# Patient Record
Sex: FEMALE | Race: WHITE | Employment: FULL TIME | URBAN - METROPOLITAN AREA
[De-identification: names, ages, dates, MRNs, and addresses within clinical notes are randomized per-mention and may not be internally consistent; named-entity substitution may affect disease eponyms.]

---

## 2017-03-07 ENCOUNTER — HOSPITAL ENCOUNTER (EMERGENCY)
Age: 27
Discharge: HOME | End: 2017-03-07
Attending: EMERGENCY MEDICINE
Payer: COMMERCIAL

## 2017-03-07 VITALS
TEMPERATURE: 98.4 F | OXYGEN SATURATION: 99 % | RESPIRATION RATE: 18 BRPM | HEART RATE: 92 BPM | SYSTOLIC BLOOD PRESSURE: 174 MMHG | HEIGHT: 64 IN | BODY MASS INDEX: 45.24 KG/M2 | DIASTOLIC BLOOD PRESSURE: 90 MMHG | WEIGHT: 265 LBS

## 2017-03-07 DIAGNOSIS — J02.0 STREP PHARYNGITIS: Primary | ICD-10-CM

## 2017-03-07 LAB
DIRECT EXAM: ABNORMAL
Lab: ABNORMAL
SPECIMEN DESCRIPTION: ABNORMAL
STATUS: ABNORMAL

## 2017-03-07 PROCEDURE — 87880 STREP A ASSAY W/OPTIC: CPT

## 2017-03-07 PROCEDURE — 6370000000 HC RX 637 (ALT 250 FOR IP): Performed by: EMERGENCY MEDICINE

## 2017-03-07 PROCEDURE — 99283 EMERGENCY DEPT VISIT LOW MDM: CPT

## 2017-03-07 RX ORDER — PENICILLIN V POTASSIUM 500 MG/1
500 TABLET ORAL 4 TIMES DAILY
Qty: 40 TABLET | Refills: 0 | Status: SHIPPED | OUTPATIENT
Start: 2017-03-07 | End: 2017-03-17

## 2017-03-07 RX ORDER — PENICILLIN V POTASSIUM 250 MG/1
500 TABLET ORAL ONCE
Status: COMPLETED | OUTPATIENT
Start: 2017-03-07 | End: 2017-03-07

## 2017-03-07 RX ADMIN — PENICILLIN V POTASIUM 500 MG: 250 TABLET ORAL at 21:20

## 2017-04-14 ENCOUNTER — OFFICE VISIT (OUTPATIENT)
Dept: FAMILY MEDICINE CLINIC | Age: 27
End: 2017-04-14
Payer: COMMERCIAL

## 2017-04-14 VITALS
DIASTOLIC BLOOD PRESSURE: 96 MMHG | HEART RATE: 99 BPM | SYSTOLIC BLOOD PRESSURE: 156 MMHG | TEMPERATURE: 98.9 F | OXYGEN SATURATION: 94 % | HEIGHT: 64 IN | WEIGHT: 264.99 LBS | BODY MASS INDEX: 45.24 KG/M2 | RESPIRATION RATE: 18 BRPM

## 2017-04-14 DIAGNOSIS — J02.9 SORE THROAT: ICD-10-CM

## 2017-04-14 DIAGNOSIS — J02.0 STREP PHARYNGITIS: Primary | ICD-10-CM

## 2017-04-14 DIAGNOSIS — I10 ESSENTIAL HYPERTENSION: ICD-10-CM

## 2017-04-14 LAB — S PYO AG THROAT QL: POSITIVE

## 2017-04-14 PROCEDURE — 87880 STREP A ASSAY W/OPTIC: CPT | Performed by: FAMILY MEDICINE

## 2017-04-14 PROCEDURE — 99213 OFFICE O/P EST LOW 20 MIN: CPT | Performed by: FAMILY MEDICINE

## 2017-04-14 RX ORDER — LISINOPRIL 5 MG/1
5 TABLET ORAL DAILY
Qty: 30 TABLET | Refills: 1 | Status: SHIPPED | OUTPATIENT
Start: 2017-04-14

## 2017-04-14 RX ORDER — AMOXICILLIN 500 MG/1
500 TABLET, FILM COATED ORAL 3 TIMES DAILY
Qty: 30 TABLET | Refills: 0 | Status: SHIPPED | OUTPATIENT
Start: 2017-04-14 | End: 2017-04-24

## 2017-04-14 RX ORDER — PREDNISONE 50 MG/1
50 TABLET ORAL DAILY
Qty: 4 TABLET | Refills: 0 | Status: SHIPPED | OUTPATIENT
Start: 2017-04-14

## 2017-04-14 ASSESSMENT — ENCOUNTER SYMPTOMS
WHEEZING: 0
NAUSEA: 0
VISUAL CHANGE: 0
EYE REDNESS: 0
RHINORRHEA: 0
CHEST TIGHTNESS: 0
COUGH: 0
EYE PAIN: 0
SORE THROAT: 1
VOMITING: 0
SINUS PRESSURE: 1
EYE DISCHARGE: 0
SWOLLEN GLANDS: 1
ABDOMINAL PAIN: 0
CHANGE IN BOWEL HABIT: 0

## 2023-02-16 ENCOUNTER — HOSPITAL ENCOUNTER (EMERGENCY)
Age: 33
Discharge: HOME | End: 2023-02-16
Attending: EMERGENCY MEDICINE
Payer: COMMERCIAL

## 2023-02-16 ENCOUNTER — APPOINTMENT (OUTPATIENT)
Dept: CT IMAGING | Age: 33
End: 2023-02-16
Payer: COMMERCIAL

## 2023-02-16 VITALS
DIASTOLIC BLOOD PRESSURE: 119 MMHG | HEART RATE: 87 BPM | RESPIRATION RATE: 18 BRPM | WEIGHT: 280 LBS | TEMPERATURE: 98.1 F | OXYGEN SATURATION: 97 % | BODY MASS INDEX: 47.8 KG/M2 | SYSTOLIC BLOOD PRESSURE: 185 MMHG | HEIGHT: 64 IN

## 2023-02-16 DIAGNOSIS — R79.89 ELEVATED LFTS: ICD-10-CM

## 2023-02-16 DIAGNOSIS — N39.0 URINARY TRACT INFECTION WITH HEMATURIA, SITE UNSPECIFIED: Primary | ICD-10-CM

## 2023-02-16 DIAGNOSIS — R10.11 ABDOMINAL PAIN, RIGHT UPPER QUADRANT: ICD-10-CM

## 2023-02-16 DIAGNOSIS — N83.202 CYSTS OF BOTH OVARIES: ICD-10-CM

## 2023-02-16 DIAGNOSIS — R31.9 URINARY TRACT INFECTION WITH HEMATURIA, SITE UNSPECIFIED: Primary | ICD-10-CM

## 2023-02-16 DIAGNOSIS — N83.201 CYSTS OF BOTH OVARIES: ICD-10-CM

## 2023-02-16 LAB
ABSOLUTE EOS #: 0.1 K/UL (ref 0–0.4)
ABSOLUTE LYMPH #: 1.9 K/UL (ref 1–4.8)
ABSOLUTE MONO #: 0.7 K/UL (ref 0.1–1.2)
ALBUMIN SERPL-MCNC: 4.5 G/DL (ref 3.5–5.2)
ALBUMIN/GLOBULIN RATIO: 1.4 (ref 1–2.5)
ALP SERPL-CCNC: 145 U/L (ref 35–104)
ALT SERPL-CCNC: 103 U/L (ref 5–33)
ANION GAP SERPL CALCULATED.3IONS-SCNC: 11 MMOL/L (ref 9–17)
AST SERPL-CCNC: 164 U/L
BACTERIA: ABNORMAL
BASOPHILS # BLD: 0 % (ref 0–2)
BASOPHILS ABSOLUTE: 0 K/UL (ref 0–0.2)
BILIRUB DIRECT SERPL-MCNC: 0.2 MG/DL
BILIRUB INDIRECT SERPL-MCNC: 0.3 MG/DL (ref 0–1)
BILIRUB SERPL-MCNC: 0.5 MG/DL (ref 0.3–1.2)
BILIRUBIN URINE: NEGATIVE
BUN SERPL-MCNC: 14 MG/DL (ref 6–20)
CALCIUM SERPL-MCNC: 9 MG/DL (ref 8.6–10.4)
CHLORIDE SERPL-SCNC: 100 MMOL/L (ref 98–107)
CO2 SERPL-SCNC: 26 MMOL/L (ref 20–31)
COLOR: YELLOW
CREAT SERPL-MCNC: 0.63 MG/DL (ref 0.5–0.9)
EOSINOPHILS RELATIVE PERCENT: 1 % (ref 1–4)
EPITHELIAL CELLS UA: ABNORMAL /HPF (ref 0–5)
GFR SERPL CREATININE-BSD FRML MDRD: >60 ML/MIN/1.73M2
GLUCOSE SERPL-MCNC: 121 MG/DL (ref 70–99)
GLUCOSE UR STRIP.AUTO-MCNC: NEGATIVE MG/DL
HCG QUALITATIVE: NEGATIVE
HCT VFR BLD AUTO: 43.2 % (ref 36–46)
HGB BLD-MCNC: 14.4 G/DL (ref 12–16)
KETONES UR STRIP.AUTO-MCNC: NEGATIVE MG/DL
LEUKOCYTE ESTERASE UR QL STRIP.AUTO: ABNORMAL
LIPASE SERPL-CCNC: 38 U/L (ref 13–60)
LYMPHOCYTES # BLD: 16 % (ref 24–44)
MCH RBC QN AUTO: 30.4 PG (ref 26–34)
MCHC RBC AUTO-ENTMCNC: 33.3 G/DL (ref 31–37)
MCV RBC AUTO: 91.3 FL (ref 80–100)
MONOCYTES # BLD: 6 % (ref 2–11)
NITRITE UR QL STRIP.AUTO: NEGATIVE
OTHER OBSERVATIONS UA: ABNORMAL
PDW BLD-RTO: 13 % (ref 12.5–15.4)
PLATELET # BLD AUTO: 325 K/UL (ref 140–450)
PMV BLD AUTO: 7.2 FL (ref 6–12)
POTASSIUM SERPL-SCNC: 4.1 MMOL/L (ref 3.7–5.3)
PROT SERPL-MCNC: 7.8 G/DL (ref 6.4–8.3)
PROT UR STRIP.AUTO-MCNC: 7 MG/DL (ref 5–8)
PROT UR STRIP.AUTO-MCNC: NEGATIVE MG/DL
RBC # BLD: 4.74 M/UL (ref 4–5.2)
RBC CLUMPS #/AREA URNS AUTO: ABNORMAL /HPF (ref 0–2)
SEG NEUTROPHILS: 77 % (ref 36–66)
SEGMENTED NEUTROPHILS ABSOLUTE COUNT: 8.9 K/UL (ref 1.8–7.7)
SODIUM SERPL-SCNC: 137 MMOL/L (ref 135–144)
SPECIFIC GRAVITY UA: 1.01 (ref 1–1.03)
TURBIDITY: CLEAR
URINE HGB: ABNORMAL
UROBILINOGEN, URINE: NORMAL
WBC # BLD AUTO: 11.6 K/UL (ref 3.5–11)
WBC UA: ABNORMAL /HPF (ref 0–5)

## 2023-02-16 PROCEDURE — 6360000002 HC RX W HCPCS: Performed by: EMERGENCY MEDICINE

## 2023-02-16 PROCEDURE — 85025 COMPLETE CBC W/AUTO DIFF WBC: CPT

## 2023-02-16 PROCEDURE — 96375 TX/PRO/DX INJ NEW DRUG ADDON: CPT

## 2023-02-16 PROCEDURE — 84703 CHORIONIC GONADOTROPIN ASSAY: CPT

## 2023-02-16 PROCEDURE — 6370000000 HC RX 637 (ALT 250 FOR IP): Performed by: EMERGENCY MEDICINE

## 2023-02-16 PROCEDURE — 36415 COLL VENOUS BLD VENIPUNCTURE: CPT

## 2023-02-16 PROCEDURE — 87086 URINE CULTURE/COLONY COUNT: CPT

## 2023-02-16 PROCEDURE — 74176 CT ABD & PELVIS W/O CONTRAST: CPT

## 2023-02-16 PROCEDURE — 80076 HEPATIC FUNCTION PANEL: CPT

## 2023-02-16 PROCEDURE — 2580000003 HC RX 258: Performed by: EMERGENCY MEDICINE

## 2023-02-16 PROCEDURE — 96374 THER/PROPH/DIAG INJ IV PUSH: CPT

## 2023-02-16 PROCEDURE — 80048 BASIC METABOLIC PNL TOTAL CA: CPT

## 2023-02-16 PROCEDURE — 81001 URINALYSIS AUTO W/SCOPE: CPT

## 2023-02-16 PROCEDURE — 83690 ASSAY OF LIPASE: CPT

## 2023-02-16 PROCEDURE — 99284 EMERGENCY DEPT VISIT MOD MDM: CPT

## 2023-02-16 RX ORDER — ONDANSETRON 2 MG/ML
4 INJECTION INTRAMUSCULAR; INTRAVENOUS ONCE
Status: COMPLETED | OUTPATIENT
Start: 2023-02-16 | End: 2023-02-16

## 2023-02-16 RX ORDER — KETOROLAC TROMETHAMINE 30 MG/ML
30 INJECTION, SOLUTION INTRAMUSCULAR; INTRAVENOUS ONCE
Status: COMPLETED | OUTPATIENT
Start: 2023-02-16 | End: 2023-02-16

## 2023-02-16 RX ORDER — ONDANSETRON 4 MG/1
4 TABLET, ORALLY DISINTEGRATING ORAL EVERY 8 HOURS PRN
Qty: 10 TABLET | Refills: 0 | Status: SHIPPED | OUTPATIENT
Start: 2023-02-16

## 2023-02-16 RX ORDER — CEPHALEXIN 500 MG/1
500 CAPSULE ORAL 2 TIMES DAILY
Qty: 14 CAPSULE | Refills: 0 | Status: SHIPPED | OUTPATIENT
Start: 2023-02-16 | End: 2023-02-23

## 2023-02-16 RX ORDER — CEPHALEXIN 250 MG/1
500 CAPSULE ORAL ONCE
Status: COMPLETED | OUTPATIENT
Start: 2023-02-16 | End: 2023-02-16

## 2023-02-16 RX ORDER — 0.9 % SODIUM CHLORIDE 0.9 %
1000 INTRAVENOUS SOLUTION INTRAVENOUS ONCE
Status: COMPLETED | OUTPATIENT
Start: 2023-02-16 | End: 2023-02-16

## 2023-02-16 RX ADMIN — SODIUM CHLORIDE 1000 ML: 9 INJECTION, SOLUTION INTRAVENOUS at 21:00

## 2023-02-16 RX ADMIN — CEPHALEXIN 500 MG: 250 CAPSULE ORAL at 22:28

## 2023-02-16 RX ADMIN — KETOROLAC TROMETHAMINE 30 MG: 30 INJECTION, SOLUTION INTRAMUSCULAR; INTRAVENOUS at 21:01

## 2023-02-16 RX ADMIN — ONDANSETRON 4 MG: 2 INJECTION INTRAMUSCULAR; INTRAVENOUS at 21:00

## 2023-02-16 ASSESSMENT — PAIN - FUNCTIONAL ASSESSMENT: PAIN_FUNCTIONAL_ASSESSMENT: 0-10

## 2023-02-16 ASSESSMENT — PAIN SCALES - GENERAL
PAINLEVEL_OUTOF10: 6
PAINLEVEL_OUTOF10: 2

## 2023-02-16 ASSESSMENT — PAIN DESCRIPTION - ORIENTATION: ORIENTATION: MID;UPPER

## 2023-02-16 ASSESSMENT — PAIN DESCRIPTION - LOCATION: LOCATION: ABDOMEN

## 2023-02-16 ASSESSMENT — PAIN DESCRIPTION - PAIN TYPE: TYPE: ACUTE PAIN

## 2023-02-17 NOTE — ED PROVIDER NOTES
[facility] Emergency Department  [id number] [address] [facility] [address] [facility] [address]  Phone: [phone]  Fax: [phone]      Pt Name: Etelvina Rodríguez  EIO:[id number]  [place] 1990  Date of evaluation: 2/16/2023      CHIEF COMPLAINT       Chief Complaint   Patient presents with    Abdominal Pain     Mid upper abd  Sudden around 7pm  Vomiting one episode  Diarrheax1     Nausea     Currently nauseous       HISTORY OF PRESENT ILLNESS   Etelvina Rodríguez is a 34 y.o. female who presents for evaluation of abdominal pain. The patient reports that she ate baked chicken and vegetables around 5:30 PM.  Starting around 7 PM she developed gradual onset, constant, progressive, sharp, stabbing, cramping, nonradiating, upper abdominal pain with associated nausea, 1 episode of nonbilious nonbloody emesis, and 1 episode of nonbloody diarrhea. She has not taken any medications for symptoms and does not list any provoking or palliating factors. She denies any history of abdominal surgeries or any abdominal injury. 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 She denies any chronic medical problems and does not take any medications on a regular basis. Her last menstrual period was on 1/18/2023 and was normal for her. She denies any recent alcohol use. The patient has had 40 pounds of intentional weight loss since August 2022. She denies any history of gallstones or gallbladder disease. The patient denies fever, chills, headache, vision changes, neck pain, back pain, chest pain, shortness of breath, urinary symptoms, vaginal bleeding, vaginal discharge, hematuria, hemoptysis, hematochezia, melena, focal weakness, numbness, tingling, dizziness, lightheadedness, syncope, recent injury or illness.     REVIEW OF SYSTEMS     Positive: Abdominal pain, nausea, vomiting, diarrhea  Ten point review of systems was reviewed and is negative unless otherwise noted in the HPI    PAST MEDICAL HISTORY    has a past medical history of Axillary pain, Hypertension, Iron deficiency anemia, and Left wrist fracture. SURGICAL HISTORY      has a past surgical history that includes Knee arthroscopy (Left, 2001/2002/2004/2006); lipoma resection (Right, 12/16/13); and Breast surgery (Right, 04/02/14). CURRENT MEDICATIONS       Discharge Medication List as of 2/16/2023 10:45 PM          ALLERGIES     has No Known Allergies. FAMILY HISTORY     She indicated that her mother is alive. She indicated that her father is alive. She indicated that her sister is alive. She indicated that her brother is alive. She indicated that the status of her maternal grandfather is unknown.     family history includes Diabetes in her maternal grandfather; Hypertension in her maternal grandfather; No Known Problems in her brother, father, mother, and sister. SOCIAL HISTORY      reports that she has never smoked. She has never used smokeless tobacco. She reports that she does not currently use alcohol. She reports that she does not use drugs. PHYSICAL EXAM     INITIAL VITALS:  height is 5' 4\" (1.626 m) and weight is 127 kg (280 lb). Her temperature is 98.1 °F (36.7 °C). Her blood pressure is 185/119 (abnormal) and her pulse is 87. Her respiration is 18 and oxygen saturation is 97%. CONSTITUTIONAL: no apparent distress, well appearing  SKIN: warm, dry, no jaundice, hives or petechiae  EYES: clear conjunctiva, non-icteric sclera  HENT: normocephalic, atraumatic, dry mucus membranes  NECK: Nontender and supple with no nuchal rigidity, full range of motion  PULMONARY: clear to auscultation without wheezes, rhonchi, or rales, normal excursion, no accessory muscle use and no stridor  CARDIOVASCULAR: regular rate, rhythm. Strong radial pulses with intact distal perfusion. Capillary refill <2 seconds.   GASTROINTESTINAL: soft, epigastric and right upper quadrant tenderness to palpation, positive Wood sign, no pain or McBurney's point, no pulsatile mass, non-distended, no palpable masses, no rebound or guarding   GENITOURINARY: No costovertebral angle tenderness to palpation  MUSCULOSKELETAL: No midline spinal tenderness, step off or deformity. Extremities are otherwise nontender to palpation and nonerythematous. Compartments soft. No peripheral edema. NEUROLOGIC: alert and oriented x 3, GCS 15, normal mentation and speech. Moves all extremities x 4 without motor or sensory deficit, gait is stable without ataxia  PSYCHIATRIC: normal mood and affect, thought process is clear and linear    DIAGNOSTIC RESULTS     EKG:  None    RADIOLOGY:   CT ABDOMEN PELVIS WO CONTRAST Additional Contrast? None    Result Date: 2/16/2023  EXAMINATION: CT OF THE ABDOMEN AND PELVIS WITHOUT CONTRAST 2/16/2023 9:32 pm TECHNIQUE: CT of the abdomen and pelvis was performed without the administration of intravenous contrast. Multiplanar reformatted images are provided for review. Automated exposure control, iterative reconstruction, and/or weight based adjustment of the mA/kV was utilized to reduce the radiation dose to as low as reasonably achievable. COMPARISON: None. HISTORY: ORDERING SYSTEM PROVIDED HISTORY: RUQ and epigastric abdominal pain, N/V/D TECHNOLOGIST PROVIDED HISTORY: RUQ and epigastric abdominal pain, N/V/D Decision Support Exception - unselect if not a suspected or confirmed emergency medical condition->Emergency Medical Condition (MA) Is the patient pregnant?->No Reason for Exam: RUQ and epigastric abdominal pain, N/V/D FINDINGS: Lower Chest: The lung bases are clear. Organs: The lack of IV contrast does reduce evaluation of the organs and vasculature. No obvious mass or nodularity along the liver margin. Biliary system is not dilated and no mineralized stone in the gallbladder. The spleen is not enlarged. There is no pancreatic calcification, ductal dilatation, or surrounding fluid collection. The adrenal glands are unremarkable.  No renal calculi, hydronephrosis, or hydroureter on either side. No perinephric stranding. GI/Bowel: The stomach, small bowel, and colon are not dilated. Moderate amount of food material is present in the stomach. The appendix is identified and no appendicitis. Mild fecal load in the ascending colon up through the hepatic flexure. The distal colon is mostly collapsed without a large rectal fecal bolus. There is no sign of diverticulitis. There is no ascites or pneumoperitoneum. Pelvis: The urinary bladder is collapsed limiting evaluation. The uterus is not enlarged. No free fluid in the pelvis. A 2 cm right ovarian cyst and 2.5 cm left ovarian cyst are likely functional with the young age. Peritoneum/Retroperitoneum: There is no abdominal aortic aneurysm or retroperitoneal hematoma. No bulky lymphadenopathy. Bones/Soft Tissues: No acute fracture or destruction of the bones. 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 Mild facet arthropathy is developing in the lumbar spine. 1.  No mineralized stone in the gallbladder or fat stranding around the gallbladder fossa. The biliary system is not dilated. 2.  No renal calculi, hydronephrosis, hydroureter, or perinephric stranding. 3.  Small cyst in the right ovary and left ovary are likely functional with the young age. No free fluid in the pelvis. No further workup is indicated. Managing Incidental Adnexal Cystic Mass by CT or MR Benign cyst: Premenopausal (< or equal to 50 years if LMP unknown) < or equal to 5 cm: no follow up >5 cm: US in 6-12 weeks > or equal to 10 cm: US promptly Reference: Iván Swain et al. Managing Incidental Findings on Abdominal CT: White Paper of the ACR Incidental Findings Committee.  J Am Bernadette Radiol [id number]        LABS:  Results for orders placed or performed during the hospital encounter of 02/16/23   CBC with Auto Differential   Result Value Ref Range    WBC 11.6 (H) 3.5 - 11.0 k/uL    RBC 4.74 4.0 - 5.2 m/uL    Hemoglobin 14.4 12.0 - 16.0 g/dL    Hematocrit 43.2 36 - 46 %    MCV 91.3 80 - 100 fL    MCH 30.4 26 - 34 pg    MCHC 33.3 31 - 37 g/dL    RDW 13.0 12.5 - 15.4 %    Platelets 768 992 - 034 k/uL    MPV 7.2 6.0 - 12.0 fL    Seg Neutrophils 77 (H) 36 - 66 %    Lymphocytes 16 (L) 24 - 44 %    Monocytes 6 2 - 11 %    Eosinophils % 1 1 - 4 %    Basophils 0 0 - 2 %    Segs Absolute 8.90 (H) 1.8 - 7.7 k/uL    Absolute Lymph # 1.90 1.0 - 4.8 k/uL    Absolute Mono # 0.70 0.1 - 1.2 k/uL    Absolute Eos # 0.10 0.0 - 0.4 k/uL    Basophils Absolute 0.00 0.0 - 0.2 k/uL   BMP   Result Value Ref Range    Glucose 121 (H) 70 - 99 mg/dL    BUN 14 6 - 20 mg/dL    Creatinine 0.63 0.50 - 0.90 mg/dL    Est, Glom Filt Rate >60 >60 mL/min/1.73m2    Calcium 9.0 8.6 - 10.4 mg/dL    Sodium 137 135 - 144 mmol/L    Potassium 4.1 3.7 - 5.3 mmol/L    Chloride 100 98 - 107 mmol/L    CO2 26 20 - 31 mmol/L    Anion Gap 11 9 - 17 mmol/L   Lipase   Result Value Ref Range    Lipase 38 13 - 60 U/L   HCG Qualitative, Serum   Result Value Ref Range    hCG Qual NEGATIVE NEGATIVE   Hepatic Function Panel   Result Value Ref Range    Albumin 4.5 3.5 - 5.2 g/dL    Alkaline Phosphatase 145 (H) 35 - 104 U/L     (H) 5 - 33 U/L     (H) <32 U/L    Total Bilirubin 0.5 0.3 - 1.2 mg/dL    Bilirubin, Direct 0.2 <0.3 mg/dL    Bilirubin, Indirect 0.3 0.0 - 1.0 mg/dL    Total Protein 7.8 6.4 - 8.3 g/dL    Albumin/Globulin Ratio 1.4 1.0 - 2.5   Urinalysis with Reflex to Culture    Specimen: Urine, clean catch   Result Value Ref Range    Color, UA Yellow Yellow    Turbidity UA Clear Clear    Glucose, Ur NEGATIVE NEGATIVE    Bilirubin Urine NEGATIVE NEGATIVE    Ketones, Urine NEGATIVE NEGATIVE    Specific Gravity, UA 1.010 1.005 - 1.030    Urine Hgb MODERATE (A) NEGATIVE    pH, UA 7.0 5.0 - 8.0    Protein, UA NEGATIVE NEGATIVE    Urobilinogen, Urine Normal Normal    Nitrite, Urine NEGATIVE NEGATIVE    Leukocyte Esterase, Urine SMALL (A) NEGATIVE   Microscopic Urinalysis   Result Value Ref Range    WBC, UA 10 TO 20 0 - 5 /HPF    RBC, UA 5 TO 10 0 - 2 /HPF    Epithelial Cells UA 2 TO 5 0 - 5 /HPF    Bacteria, UA MODERATE (A) None    Other Observations UA Culture ordered based on defined criteria. (A) NOT REQ. EMERGENCY DEPARTMENT COURSE:        The patient was given the following medications:  Orders Placed This Encounter   Medications    0.9 % sodium chloride bolus    ondansetron (ZOFRAN) injection 4 mg    ketorolac (TORADOL) injection 30 mg    cephALEXin (KEFLEX) capsule 500 mg     Order Specific Question:   Antimicrobial Indications     Answer:   Urinary Tract Infection    cephALEXin (KEFLEX) 500 MG capsule     Sig: Take 1 capsule by mouth 2 times daily for 7 days     Dispense:  14 capsule     Refill:  0    ondansetron (ZOFRAN-ODT) 4 MG disintegrating tablet     Sig: Take 1 tablet by mouth every 8 hours as needed for Nausea or Vomiting     Dispense:  10 tablet     Refill:  0        Vitals:    Vitals:    02/16/23 2013 02/16/23 2152 02/16/23 2221 02/16/23 2255   BP: (!) 178/120 (!) 191/116  (!) 185/119   Pulse: 100 (!) 102 90 87   Resp: 18 18  18   Temp: 98.1 °F (36.7 °C)      SpO2: 99% 99%  97%   Weight: 127 kg (280 lb)      Height: 5' 4\" (1.626 m)        -------------------------  BP: (!) 185/119, Temp: 98.1 °F (36.7 °C), Heart Rate: 87, Resp: 18    CONSULTS:  None    CRITICAL CARE:   None    PROCEDURES:  None    DIAGNOSIS/ MDM:   Camilla Mcneal is a 34 y.o. female who presents with nausea, vomiting, diarrhea and abdominal pain. Vital signs are stable except for elevated blood pressure. She states that this has been a chronic issue. She denies any associated headache, lightheadedness or dizziness. Abdomen is soft with right upper quadrant and epigastric tenderness to palpation. Positive Wood sign. Urinalysis shows signs of infection and she was started on Keflex. CBC, BMP, lipase and hCG are unremarkable. ALT and AST are elevated at 103 and 164. CT abdomen pelvis shows no mineralized stone in the gallbladder fasting around the gallbladder fossa. The biliary system is not dilated. There is no renal calculi, signs of urinary infection, or perinephric stranding. She does have a small cyst in the right ovary and left ovary that are likely functional ovarian cyst.  The patient was updated on all findings. Her pain was controlled with Toradol. Her nausea improved with Zofran and IV fluids. I suspect the patient's symptoms are secondary to gallbladder colic. She has lost 40 pounds over the past 6 months and this may have been a trigger. I explained that she will need further evaluation outpatient with a gallbladder ultrasound and possible HIDA scan. The patient was instructed to take ibuprofen or Tylenol as needed for pain and to take Zofran as needed for nausea. She was instructed to take her antibiotic as prescribed and to return to the ER for worsening symptoms or any other concern. The patient understands that at this time there is no evidence for a more malignant underlying process, but also understands that early in the process of an illness or injury, an emergency department work-up can be falsely reassuring. Routine discharge counseling was given, and the patient understands that worsening, changing or persistent symptoms should prompt a immediate call or follow-up with their primary care physician or return to the emergency department. The importance of appropriate follow-up was also discussed. I have reviewed the disposition diagnosis with the patient. I have answered their questions and given discharge instructions. They voiced understanding of these instructions and did not have any further questions or complaints. FINAL IMPRESSION      1. Urinary tract infection with hematuria, site unspecified    2. Elevated LFTs    3. Cysts of both ovaries    4.  Abdominal pain, right upper quadrant DISPOSITION/PLAN   DISPOSITION Decision To Discharge 02/16/2023 10:44:01 PM        PATIENT REFERRED TO:  Elba Perez, [address] [facility] Jae Jewell [phone]    Schedule an appointment as soon as possible for a visit in 2 days      [facility] Emergency Department  [address] [place] [address]  [phone]  Go to   If symptoms worsen    DISCHARGE MEDICATIONS:  Discharge Medication List as of 2/16/2023 10:45 PM        START taking these medications    Details   cephALEXin (KEFLEX) 500 MG capsule Take 1 capsule by mouth 2 times daily for 7 days, Disp-14 capsule, R-0Normal      ondansetron (ZOFRAN-ODT) 4 MG disintegrating tablet Take 1 tablet by mouth every 8 hours as needed for Nausea or Vomiting, Disp-10 tablet, R-0Normal             (Please note that portions of this note were completed with a voice recognitionprogram.  Efforts were made to edit the dictations but occasionally words are mis-transcribed.)    Tylor Jaquez DO, [facility]  Emergency Physician Attending          Tylor Jaquez DO  02/17/23 0971

## 2023-02-17 NOTE — DISCHARGE INSTRUCTIONS
If given narcotics (opiates) during this Emergency Department visit, please do not drink, drive or operate any machinery for at least 4 - 6 hours. Talk to your doctor about ordering a ultrasound of your gall bladder and repeating your liver function tests. Avoid eating any spicy food, milk type products or drinks that have caffeine in it. Take all medications as prescribed. For pain use ibuprofen (Motrin) or acetaminophen (Tylenol), unless prescribed medications that have acetaminophen in it. You can take over the counter acetaminophen tablets (1 - 2 tablets of the 500-mg strength every 6 hours) or ibuprofen tablets (2 tablets every 4 hours). PLEASE RETURN TO THE EMERGENCY DEPARTMENT IMMEDIATELY for worsening symptoms, or if you develop any concerning symptoms such as: high fever not relieved by acetaminophen (Tylenol) and/or ibuprofen (Motrin), chills, shortness of breath, chest pain, persistent nausea and/or vomiting, numbness, weakness or tingling in the arms or legs or change in color of the extremities, changes in mental status, persistent headache, blurry vision. Return within 8 - 12 hours if you have any of the following: worsening of pain in your abdomen, no food sounds good to you, you continue to vomit, pain goes to your back, have pain in the abdomen when going over a bump in the car or when you jump up and down, develop vaginal bleeding or discharge, inability to urinate, unable to follow up with your physician, or other any other care or concern.

## 2023-02-18 LAB
MICROORGANISM SPEC CULT: NORMAL
SPECIMEN DESCRIPTION: NORMAL

## 2023-03-01 ENCOUNTER — OFFICE VISIT (OUTPATIENT)
Dept: SURGERY | Age: 33
End: 2023-03-01

## 2023-03-01 VITALS
SYSTOLIC BLOOD PRESSURE: 162 MMHG | BODY MASS INDEX: 48.04 KG/M2 | TEMPERATURE: 97.8 F | HEART RATE: 84 BPM | DIASTOLIC BLOOD PRESSURE: 95 MMHG | WEIGHT: 279.9 LBS

## 2023-03-01 DIAGNOSIS — K80.20 SYMPTOMATIC CHOLELITHIASIS: Primary | ICD-10-CM

## 2023-03-01 RX ORDER — LISINOPRIL 10 MG/1
20 TABLET ORAL DAILY
COMMUNITY

## 2023-03-01 NOTE — PROGRESS NOTES
History and Physical - Surgery Clinic    Patient's Name: Rama Dennis  MRN: [id number]  YOB: 1990 (34 y.o.)    Date of Visit: March 1, 2023     CC: symptomatic cholelithiasis    HPI: Rama Dennis is a/an 34 y.o. female who presents to [address] [place] [address] for evaluation of midepigastric and right upper quadrant pain for the last 2 weeks. She initially started having abdominal pain on 2/16/2023 where she went to the emergency room for evaluation. Her lab work was within normal limits with a mildly elevated LFTs. Her CT scan was performed and did not show obvious gallstones or thickening around the gallbladder. She was sent home, but has had continued epigastric and right upper quadrant pain causing her to eat very bland food. She has lost 50 pounds over the last 6 months. She denies abdominal surgeries. She had an ultrasound by her PCP yesterday which showed a gallstone in the neck of the gallbladder [facility]. On evaluation today, the patient has tenderness to palpation in the right upper abdomen. She has had continued nausea and vomiting over the last 2 weeks. Past Medical History:   Diagnosis Date    Axillary pain     Bilat    Hypertension     Iron deficiency anemia     Left wrist fracture 2002       Past Surgical History:   Procedure Laterality Date    BREAST SURGERY Right 04/02/14    Needle Aspiration    KNEE ARTHROSCOPY Left 2001/2002/2004/2006    No meniscus left    LIPOMA RESECTION Right 12/16/13    Axillary       Current Outpatient Medications   Medication Sig Dispense Refill    lisinopril (PRINIVIL;ZESTRIL) 10 MG tablet Take 10 mg by mouth daily      ondansetron (ZOFRAN-ODT) 4 MG disintegrating tablet Take 1 tablet by mouth every 8 hours as needed for Nausea or Vomiting 10 tablet 0     No current facility-administered medications for this visit.        No Known Allergies    Family History   Problem Relation Age of Onset    Diabetes Maternal Grandfather     Hypertension Maternal Grandfather     No Known Problems Mother     No Known Problems Father     No Known Problems Sister     No Known Problems Brother        Social History     Socioeconomic History    Marital status: Single     Spouse name: Not on file    Number of children: Not on file    Years of education: Not on file    Highest education level: Not on file   Occupational History    Not on file   Tobacco Use    Smoking status: Never    Smokeless tobacco: Never   Vaping Use    Vaping Use: Never used   Substance and Sexual Activity    Alcohol use: Not Currently    Drug use: No    Sexual activity: Never   Other Topics Concern    Not on file   Social History Narrative    Not on file     Social Determinants of Health     Financial Resource Strain: Not on file   Food Insecurity: Not on file   Transportation Needs: Not on file   Physical Activity: Not on file   Stress: Not on file   Social Connections: Not on file   Intimate Partner Violence: Not on file   Housing Stability: Not on file       Review of Systems:   GEN: Denies recent weight loss, fatigue, fevers, chills. HEENT: No rhinorrhea, dysphagia, odynphagia. CV: Denies recent chest pain. No history of MI or arrhythmias. RESP: Denies shortness of breath, COPD, asthma. GI: As per HPI  : Denies increased frequency or dysuria. HEM[de-identified] Denies history of anemia or DVTs. ENDO: Denies history of thyroid problems  MSK: Denies joint and back pain. NEURO: Denies history of previous stroke    Physical Exam:    Vitals:    03/01/23 1130   BP: (!) 162/95   Pulse: 84   Temp: 97.8 °F (36.6 °C)       General: Alert and oriented x 3. Non toxic in appearance. No acute distress. Head[de-identified]  Non traumatic  Eyes: pupils reactive,  EOMI bilaterally  Neck: trachea midline. Heart: Regular rate and rhythm. Lungs: No respiratory distress, clear breath sounds bilaterally. Abdomen: Soft, nondistended, tender to palpation in the right upper quadrant without guarding or rebound tenderness.   No peritoneal signs. Extremity: No gross deformity in all four extremities. Skin: No skin lesion, erythema, rash. Neuro: CN II-XII grossly intact. No motor or sensory deficits appreciated. MSK: Strength intact in all four extremities. Labs:    Liver function test showed an alkaline phosphatase on 145, ALT of 103, AST of 164 on 2/16/2023. Bilirubin was within normal limits. Imaging:     Right upper quadrant ultrasound revealed a gallstone in the neck of the gallbladder without thickening or pericholecystic fluid. Assessment  Symptomatic cholelithiasis      Plan:  I recommend proceeding to the operating room for robotic assisted laparoscopic cholecystectomy due to concerns for 2 weeks of symptomatic cholelithiasis. I discussed with her the risks which include but are not limited to infection, bleeding, damage to surrounding structures requiring further surgical procedures, bile leaks, anesthesia risks. I discussed the benefits and alternatives. All questions and concerns were addressed. We will plan to proceed with a robotic assisted laparoscopic cholecystectomy this Friday. We discussed restrictions postoperatively being no lifting, pushing, pulling greater than 10 pounds for 2 weeks. This will likely require to be off work for 2 weeks.       Kermit Christensen DO  3/1/2023

## 2023-03-01 NOTE — H&P (VIEW-ONLY)
HISTORY and [facility]       NAME:  Beka Mcmullen  MRN: [id number]   YOB: 1990   Date: 3/2/2023   Age: 34 y.o. Gender: female       COMPLAINT AND PRESENT HISTORY:     Beka Mcmullen is 34 y.o. female, undergoing preadmission testing for symptomatic cholelithiasis with scheduled CHOLECYSTECTOMY LAPAROSCOPIC ROBOTIC XI per Dr. Gilford Pies. Below italics a portion of surgery office visit note by Dr. Gilford Pies dated 03/01/2023: Reviewed  CC: symptomatic cholelithiasis     HPI: Beka Mcmullen is a/an 34 y.o. female who presents to [address] [place] [address] for evaluation of midepigastric and right upper quadrant pain for the last 2 weeks. She initially started having abdominal pain on 2/16/2023 where she went to the emergency room for evaluation. Her lab work was within normal limits with a mildly elevated LFTs. Her CT scan was performed and did not show obvious gallstones or thickening around the gallbladder. She was sent home, but has had continued epigastric and right upper quadrant pain causing her to eat very bland food. She has lost 50 pounds over the last 6 months. She denies abdominal surgeries. She had an ultrasound by her PCP yesterday which showed a gallstone in the neck of the gallbladder [facility]. On evaluation today, the patient has tenderness to palpation in the right upper abdomen. She has had continued nausea and vomiting over the last 2 weeks. UPDATE: Pt treated with Keflex for UTI found on UA at ED visit. Denies current urinary symptoms including dysuria, hematuria, frequency and urgency. Completed course of antibiotics as prescribed. Pt continues to have RUQ abdominal and epigastric pain, nausea without associated vomiting. Takes zofran with good relief. No diarrhea or constipation. No change in the color of the stools. No current fever or chills. Denies Hx of MRSA infection. PMHx includes:    HTN: Associated medications include: Lisinopril.  BP elevated today. Reports her BP is always elevated in a healthcare setting. She relays that her lisinopril dose was increased to 20 mg daily with first dose being today. Pt was seen by PCP on 02/20/2023. Denies recent or current chest pain/pressure, palpitations, SOB, headaches, dizziness, lower extremity edema. BP Readings from Last 3 Encounters:   03/02/23 (!) 146/102   03/01/23 (!) 162/95   02/16/23 (!) 185/119       Denies personal and family history of complications with anesthesia. RECENT LABS, IMAGING AND TESTING     EKG done today in PAT with prelim result: Normal sinus rhythm. Lab Results   Component Value Date    WBC 11.6 (H) 02/16/2023    RBC 4.74 02/16/2023    HGB 14.4 02/16/2023    HCT 43.2 02/16/2023    MCV 91.3 02/16/2023    MCH 30.4 02/16/2023    MCHC 33.3 02/16/2023    RDW 13.0 02/16/2023     02/16/2023    MPV 7.2 02/16/2023        Lab Results   Component Value Date     02/16/2023    K 4.1 02/16/2023     02/16/2023    CO2 26 02/16/2023    BUN 14 02/16/2023    CREATININE 0.63 02/16/2023    GLUCOSE 121 (H) 02/16/2023    CALCIUM 9.0 02/16/2023    PROT 7.8 02/16/2023    LABALBU 4.5 02/16/2023    BILITOT 0.5 02/16/2023    ALKPHOS 145 (H) 02/16/2023     (H) 02/16/2023     (H) 02/16/2023     Narrative   EXAMINATION:   CT OF THE ABDOMEN AND PELVIS WITHOUT CONTRAST 2/16/2023 9:32 pm       TECHNIQUE:   CT of the abdomen and pelvis was performed without the administration of   intravenous contrast. Multiplanar reformatted images are provided for review. Automated exposure control, iterative reconstruction, and/or weight based   adjustment of the mA/kV was utilized to reduce the radiation dose to as low   as reasonably achievable. COMPARISON:   None.        HISTORY:   ORDERING SYSTEM PROVIDED HISTORY: RUQ and epigastric abdominal pain, N/V/D   TECHNOLOGIST PROVIDED HISTORY:   RUQ and epigastric abdominal pain, N/V/D       Decision Support Exception - unselect if not a suspected or confirmed   emergency medical condition->Emergency Medical Condition (MA)   Is the patient pregnant?->No   Reason for Exam: RUQ and epigastric abdominal pain, N/V/D       FINDINGS:   Lower Chest: The lung bases are clear. Organs: The lack of IV contrast does reduce evaluation of the organs and   vasculature. No obvious mass or nodularity along the liver margin. Biliary   system is not dilated and no mineralized stone in the gallbladder. The   spleen is not enlarged. There is no pancreatic calcification, ductal   dilatation, or surrounding fluid collection. The adrenal glands are   unremarkable. No renal calculi, hydronephrosis, or hydroureter on either side. No   perinephric stranding. GI/Bowel: The stomach, small bowel, and colon are not dilated. Moderate   amount of food material is present in the stomach. The appendix is   identified and no appendicitis. Mild fecal load in the ascending colon up   through the hepatic flexure. The distal colon is mostly collapsed without a   large rectal fecal bolus. There is no sign of diverticulitis. There is no   ascites or pneumoperitoneum. Pelvis: The urinary bladder is collapsed limiting evaluation. The uterus is   not enlarged. No free fluid in the pelvis. A 2 cm right ovarian cyst and   2.5 cm left ovarian cyst are likely functional with the young age. Peritoneum/Retroperitoneum: There is no abdominal aortic aneurysm or   retroperitoneal hematoma. No bulky lymphadenopathy. Bones/Soft Tissues: No acute fracture or destruction of the bones. There are   degenerative changes along the sacroiliac joints and may have osteitis   condensans ilii. Mild facet arthropathy is developing in the lumbar spine. Impression   1. No mineralized stone in the gallbladder or fat stranding around the   gallbladder fossa. The biliary system is not dilated.        2.  No renal calculi, hydronephrosis, hydroureter, or perinephric stranding. 3.  Small cyst in the right ovary and left ovary are likely functional with   the young age. No free fluid in the pelvis. No further workup is indicated. Managing Incidental Adnexal Cystic Mass by CT or MR       Benign cyst:       Premenopausal (< or equal to 50 years if LMP unknown)       < or equal to 5 cm: no follow up       >5 cm: US in 6-12 weeks       > or equal to 10 cm: US promptly       Reference:       Ernesto Quintero et al. Managing Incidental Findings on Abdominal CT: White Paper of   the ACR Incidental Findings Committee.  J Am Bernadette Radiol 2010;7:754-773       PAST MEDICAL HISTORY     Past Medical History:   Diagnosis Date    Abdominal pain 02/2023    Axillary pain     Bilat    Cholelithiasis     Hypertension     Iron deficiency anemia     Left wrist fracture 01/01/2002    Nausea        SURGICAL HISTORY       Past Surgical History:   Procedure Laterality Date    BREAST SURGERY Right 04/02/2014    needle aspiration    KNEE ARTHROSCOPY Left 2001/2002/2004/2006    No meniscus left    LIPOMA RESECTION Right 12/16/2013    Axillary       FAMILY HISTORY       Family History   Problem Relation Age of Onset    Diabetes Maternal Grandfather     Hypertension Maternal Grandfather     No Known Problems Mother     No Known Problems Father     No Known Problems Sister     No Known Problems Brother        SOCIAL HISTORY       Social History     Socioeconomic History    Marital status: Single     Spouse name: None    Number of children: None    Years of education: None    Highest education level: None   Tobacco Use    Smoking status: Never    Smokeless tobacco: Never   Vaping Use    Vaping Use: Never used   Substance and Sexual Activity    Alcohol use: Yes     Comment: very rare    Drug use: No    Sexual activity: Never        REVIEW OF SYSTEMS      No Known Allergies    Current Outpatient Medications on File Prior to Encounter   Medication Sig Dispense Refill    lisinopril (PRINIVIL;ZESTRIL) 10 MG tablet Take 20 mg by mouth daily      ondansetron (ZOFRAN-ODT) 4 MG disintegrating tablet Take 1 tablet by mouth every 8 hours as needed for Nausea or Vomiting 10 tablet 0     No current facility-administered medications on file prior to encounter. Review of Systems   Constitutional:  Positive for appetite change. Negative for chills, fatigue, fever and unexpected weight change. HENT:  Negative for congestion, dental problem, hearing loss and sore throat. Eyes:  Negative for visual disturbance. Respiratory:  Negative for cough, shortness of breath and wheezing. Cardiovascular:  Negative for chest pain, palpitations and leg swelling. Gastrointestinal:         See HPI   Genitourinary: Negative. Musculoskeletal:  Negative for back pain and neck pain. Skin:  Negative for rash and wound. Neurological:  Negative for dizziness, speech difficulty, light-headedness and numbness. Hematological:  Does not bruise/bleed easily. Psychiatric/Behavioral: Negative. GENERAL PHYSICAL EXAM     Vitals: BP (!) 146/102 Comment: 156/103  Pulse 84   Temp 98 °F (36.7 °C)   Resp 18   Ht 5' 4\" (1.626 m)   Wt 275 lb (124.7 kg)   LMP 02/14/2023 (Approximate)   SpO2 100%   BMI 47.20 kg/m²  Body mass index is 47.2 kg/m². Physical Exam  Constitutional:       General: She is not in acute distress. Appearance: She is well-developed. She is obese. She is not ill-appearing. HENT:      Head: Normocephalic and atraumatic. Nose: Nose normal.      Mouth/Throat:      Mouth: Mucous membranes are moist.      Pharynx: Oropharynx is clear. No oropharyngeal exudate or posterior oropharyngeal erythema. Eyes:      General: No scleral icterus. Right eye: No discharge. Left eye: No discharge. Pupils: Pupils are equal, round, and reactive to light. Neck:      Trachea: No tracheal deviation.    Cardiovascular:      Rate and Rhythm: Normal rate and regular rhythm. Heart sounds: Normal heart sounds. No murmur heard. No friction rub. No gallop. Pulmonary:      Effort: Pulmonary effort is normal. No respiratory distress. Breath sounds: Normal breath sounds. No wheezing, rhonchi or rales. Abdominal:      General: Bowel sounds are normal. There is no distension. Palpations: Abdomen is soft. Tenderness: There is abdominal tenderness. There is no guarding. Musculoskeletal:      Cervical back: Neck supple. Right lower leg: No edema. Left lower leg: No edema. Skin:     General: Skin is warm and dry. Coloration: Skin is not jaundiced. Findings: No bruising, erythema or rash. Neurological:      General: No focal deficit present. Mental Status: She is alert and oriented to person, place, and time. Cranial Nerves: No cranial nerve deficit.       Gait: Gait normal.   Psychiatric:         Mood and Affect: Mood normal.      PROVISIONAL DIAGNOSES / SURGERY:      CHOLECYSTECTOMY LAPAROSCOPIC ROBOTIC XI    Symptomatic cholelithiasis [K80.20]    Patient Active Problem List    Diagnosis Date Noted    Symptomatic cholelithiasis 03/01/2023    Obesity 08/22/2014    Hyperlipemia 10/29/2013    Axillary pain     HTN (hypertension) 09/25/2013    Iron deficiency anemia 09/24/2013    Allergic rhinitis 08/12/2013    Family history of diabetes mellitus 08/12/2013           NICK Mulligan - CNP on 3/2/2023 at 10:38 AM    Total time spent on encounter- PAT provider minutes: 31-40 minutes

## 2023-03-01 NOTE — H&P
HISTORY and PHYSICAL  [facility]       NAME:  Ale Srivastava  MRN: [id number]   YOB: 1990   Date: 3/2/2023   Age: 32 y.o.  Gender: female       COMPLAINT AND PRESENT HISTORY:     Ale Srivastava is 32 y.o. female, undergoing preadmission testing for symptomatic cholelithiasis with scheduled CHOLECYSTECTOMY LAPAROSCOPIC ROBOTIC XI per Dr. Christensen.     Below italics a portion of surgery office visit note by Dr. Christensen dated 03/01/2023: Reviewed  CC: symptomatic cholelithiasis     HPI: Ale Srivastava is a/an 32 y.o. female who presents to [facility] for evaluation of midepigastric and right upper quadrant pain for the last 2 weeks.  She initially started having abdominal pain on 2/16/2023 where she went to the emergency room for evaluation.  Her lab work was within normal limits with a mildly elevated LFTs.  Her CT scan was performed and did not show obvious gallstones or thickening around the gallbladder.  She was sent home, but has had continued epigastric and right upper quadrant pain causing her to eat very bland food.  She has lost 50 pounds over the last 6 months.  She denies abdominal surgeries.  She had an ultrasound by her PCP yesterday which showed a gallstone in the neck of the gallbladder [facility].     On evaluation today, the patient has tenderness to palpation in the right upper abdomen.  She has had continued nausea and vomiting over the last 2 weeks.    UPDATE: Pt treated with Keflex for UTI found on UA at ED visit. Denies current urinary symptoms including dysuria, hematuria, frequency and urgency. Completed course of antibiotics as prescribed. Pt continues to have RUQ abdominal and epigastric pain, nausea without associated vomiting. Takes zofran with good relief. No diarrhea or constipation. No change in the color of the stools. No current fever or chills. Denies Hx of MRSA infection.     PMHx includes:    HTN: Associated medications include: Lisinopril. BP  elevated today. Reports her BP is always elevated in a healthcare setting. She relays that her lisinopril dose was increased to 20 mg daily with first dose being today. Pt was seen by PCP on 02/20/2023. Denies recent or current chest pain/pressure, palpitations, SOB, headaches, dizziness, lower extremity edema. BP Readings from Last 3 Encounters:   03/02/23 (!) 146/102   03/01/23 (!) 162/95   02/16/23 (!) 185/119       Denies personal and family history of complications with anesthesia. RECENT LABS, IMAGING AND TESTING     EKG done today in PAT with prelim result: Normal sinus rhythm. Lab Results   Component Value Date    WBC 11.6 (H) 02/16/2023    RBC 4.74 02/16/2023    HGB 14.4 02/16/2023    HCT 43.2 02/16/2023    MCV 91.3 02/16/2023    MCH 30.4 02/16/2023    MCHC 33.3 02/16/2023    RDW 13.0 02/16/2023     02/16/2023    MPV 7.2 02/16/2023        Lab Results   Component Value Date     02/16/2023    K 4.1 02/16/2023     02/16/2023    CO2 26 02/16/2023    BUN 14 02/16/2023    CREATININE 0.63 02/16/2023    GLUCOSE 121 (H) 02/16/2023    CALCIUM 9.0 02/16/2023    PROT 7.8 02/16/2023    LABALBU 4.5 02/16/2023    BILITOT 0.5 02/16/2023    ALKPHOS 145 (H) 02/16/2023     (H) 02/16/2023     (H) 02/16/2023     Narrative   EXAMINATION:   CT OF THE ABDOMEN AND PELVIS WITHOUT CONTRAST 2/16/2023 9:32 pm       TECHNIQUE:   CT of the abdomen and pelvis was performed without the administration of   intravenous contrast. Multiplanar reformatted images are provided for review. Automated exposure control, iterative reconstruction, and/or weight based   adjustment of the mA/kV was utilized to reduce the radiation dose to as low   as reasonably achievable. COMPARISON:   None.        HISTORY:   ORDERING SYSTEM PROVIDED HISTORY: RUQ and epigastric abdominal pain, N/V/D   TECHNOLOGIST PROVIDED HISTORY:   RUQ and epigastric abdominal pain, N/V/D       Decision Support Exception - unselect if not a suspected or confirmed   emergency medical condition->Emergency Medical Condition (MA)   Is the patient pregnant?->No   Reason for Exam: RUQ and epigastric abdominal pain, N/V/D       FINDINGS:   Lower Chest: The lung bases are clear. Organs: The lack of IV contrast does reduce evaluation of the organs and   vasculature. No obvious mass or nodularity along the liver margin. Biliary   system is not dilated and no mineralized stone in the gallbladder. The   spleen is not enlarged. There is no pancreatic calcification, ductal   dilatation, or surrounding fluid collection. The adrenal glands are   unremarkable. No renal calculi, hydronephrosis, or hydroureter on either side. No   perinephric stranding. GI/Bowel: The stomach, small bowel, and colon are not dilated. Moderate   amount of food material is present in the stomach. The appendix is   identified and no appendicitis. Mild fecal load in the ascending colon up   through the hepatic flexure. The distal colon is mostly collapsed without a   large rectal fecal bolus. There is no sign of diverticulitis. There is no   ascites or pneumoperitoneum. Pelvis: The urinary bladder is collapsed limiting evaluation. The uterus is   not enlarged. No free fluid in the pelvis. A 2 cm right ovarian cyst and   2.5 cm left ovarian cyst are likely functional with the young age. Peritoneum/Retroperitoneum: There is no abdominal aortic aneurysm or   retroperitoneal hematoma. No bulky lymphadenopathy. Bones/Soft Tissues: No acute fracture or destruction of the bones. There are   degenerative changes along the sacroiliac joints and may have osteitis   condensans ilii. Mild facet arthropathy is developing in the lumbar spine. Impression   1. No mineralized stone in the gallbladder or fat stranding around the   gallbladder fossa. The biliary system is not dilated.        2.  No renal calculi, hydronephrosis, hydroureter, or perinephric stranding. 3.  Small cyst in the right ovary and left ovary are likely functional with   the young age. No free fluid in the pelvis. No further workup is indicated. Managing Incidental Adnexal Cystic Mass by CT or MR       Benign cyst:       Premenopausal (< or equal to 50 years if LMP unknown)       < or equal to 5 cm: no follow up       >5 cm: US in 6-12 weeks       > or equal to 10 cm: US promptly       Reference:       Ernesto Quintero et al. Managing Incidental Findings on Abdominal CT: White Paper of   the ACR Incidental Findings Committee.  J Am Bernadette Radiol 2010;7:[id number]       PAST MEDICAL HISTORY     Past Medical History:   Diagnosis Date    Abdominal pain 02/2023    Axillary pain     Bilat    Cholelithiasis     Hypertension     Iron deficiency anemia     Left wrist fracture 01/01/2002    Nausea        SURGICAL HISTORY       Past Surgical History:   Procedure Laterality Date    BREAST SURGERY Right 04/02/2014    needle aspiration    KNEE ARTHROSCOPY Left 2001/2002/2004/2006    No meniscus left    LIPOMA RESECTION Right 12/16/2013    Axillary       FAMILY HISTORY       Family History   Problem Relation Age of Onset    Diabetes Maternal Grandfather     Hypertension Maternal Grandfather     No Known Problems Mother     No Known Problems Father     No Known Problems Sister     No Known Problems Brother        SOCIAL HISTORY       Social History     Socioeconomic History    Marital status: Single     Spouse name: None    Number of children: None    Years of education: None    Highest education level: None   Tobacco Use    Smoking status: Never    Smokeless tobacco: Never   Vaping Use    Vaping Use: Never used   Substance and Sexual Activity    Alcohol use: Yes     Comment: very rare    Drug use: No    Sexual activity: Never        REVIEW OF SYSTEMS      No Known Allergies    Current Outpatient Medications on File Prior to Encounter   Medication Sig Dispense Refill    lisinopril (PRINIVIL;ZESTRIL) 10 MG tablet Take 20 mg by mouth daily      ondansetron (ZOFRAN-ODT) 4 MG disintegrating tablet Take 1 tablet by mouth every 8 hours as needed for Nausea or Vomiting 10 tablet 0     No current facility-administered medications on file prior to encounter. Review of Systems   Constitutional:  Positive for appetite change. Negative for chills, fatigue, fever and unexpected weight change. HENT:  Negative for congestion, dental problem, hearing loss and sore throat. Eyes:  Negative for visual disturbance. Respiratory:  Negative for cough, shortness of breath and wheezing. Cardiovascular:  Negative for chest pain, palpitations and leg swelling. Gastrointestinal:         See HPI   Genitourinary: Negative. Musculoskeletal:  Negative for back pain and neck pain. Skin:  Negative for rash and wound. Neurological:  Negative for dizziness, speech difficulty, light-headedness and numbness. Hematological:  Does not bruise/bleed easily. Psychiatric/Behavioral: Negative. GENERAL PHYSICAL EXAM     Vitals: BP (!) 146/102 Comment: 156/103  Pulse 84   Temp 98 °F (36.7 °C)   Resp 18   Ht 5' 4\" (1.626 m)   Wt 275 lb (124.7 kg)   LMP 02/14/2023 (Approximate)   SpO2 100%   BMI 47.20 kg/m²  Body mass index is 47.2 kg/m². Physical Exam  Constitutional:       General: She is not in acute distress. Appearance: She is well-developed. She is obese. She is not ill-appearing. HENT:      Head: Normocephalic and atraumatic. Nose: Nose normal.      Mouth/Throat:      Mouth: Mucous membranes are moist.      Pharynx: Oropharynx is clear. No oropharyngeal exudate or posterior oropharyngeal erythema. Eyes:      General: No scleral icterus. Right eye: No discharge. Left eye: No discharge. Pupils: Pupils are equal, round, and reactive to light. Neck:      Trachea: No tracheal deviation.    Cardiovascular:      Rate and Rhythm: Normal rate and regular rhythm. Heart sounds: Normal heart sounds. No murmur heard. No friction rub. No gallop. Pulmonary:      Effort: Pulmonary effort is normal. No respiratory distress. Breath sounds: Normal breath sounds. No wheezing, rhonchi or rales. Abdominal:      General: Bowel sounds are normal. There is no distension. Palpations: Abdomen is soft. Tenderness: There is abdominal tenderness. There is no guarding. Musculoskeletal:      Cervical back: Neck supple. Right lower leg: No edema. Left lower leg: No edema. Skin:     General: Skin is warm and dry. Coloration: Skin is not jaundiced. Findings: No bruising, erythema or rash. Neurological:      General: No focal deficit present. Mental Status: She is alert and oriented to person, place, and time. Cranial Nerves: No cranial nerve deficit.       Gait: Gait normal.   Psychiatric:         Mood and Affect: Mood normal.      PROVISIONAL DIAGNOSES / SURGERY:      CHOLECYSTECTOMY LAPAROSCOPIC ROBOTIC XI    Symptomatic cholelithiasis [K80.20]    Patient Active Problem List    Diagnosis Date Noted    Symptomatic cholelithiasis 03/01/2023    Obesity 08/22/2014    Hyperlipemia 10/29/2013    Axillary pain     HTN (hypertension) 09/25/2013    Iron deficiency anemia 09/24/2013    Allergic rhinitis 08/12/2013    Family history of diabetes mellitus 08/12/2013           NICK Fu - CNP on 3/2/2023 at 10:38 AM    Total time spent on encounter- PAT provider minutes: 31-40 minutes

## 2023-03-02 ENCOUNTER — HOSPITAL ENCOUNTER (OUTPATIENT)
Dept: PREADMISSION TESTING | Age: 33
Setting detail: OUTPATIENT SURGERY
End: 2023-03-02
Attending: STUDENT IN AN ORGANIZED HEALTH CARE EDUCATION/TRAINING PROGRAM | Admitting: STUDENT IN AN ORGANIZED HEALTH CARE EDUCATION/TRAINING PROGRAM
Payer: COMMERCIAL

## 2023-03-02 ENCOUNTER — ANESTHESIA EVENT (OUTPATIENT)
Dept: OPERATING ROOM | Age: 33
End: 2023-03-02
Payer: COMMERCIAL

## 2023-03-02 VITALS
HEIGHT: 64 IN | OXYGEN SATURATION: 100 % | RESPIRATION RATE: 18 BRPM | DIASTOLIC BLOOD PRESSURE: 102 MMHG | HEART RATE: 84 BPM | TEMPERATURE: 98 F | BODY MASS INDEX: 46.95 KG/M2 | SYSTOLIC BLOOD PRESSURE: 146 MMHG | WEIGHT: 275 LBS

## 2023-03-02 PROCEDURE — APPSS45 APP SPLIT SHARED TIME 31-45 MINUTES: Performed by: NURSE PRACTITIONER

## 2023-03-02 PROCEDURE — 93005 ELECTROCARDIOGRAM TRACING: CPT | Performed by: ANESTHESIOLOGY

## 2023-03-02 ASSESSMENT — ENCOUNTER SYMPTOMS
SORE THROAT: 0
WHEEZING: 0
COUGH: 0
BACK PAIN: 0
SHORTNESS OF BREATH: 0

## 2023-03-02 NOTE — DISCHARGE INSTRUCTIONS
Pre-op Instructions For Out-Patient Surgery    Medication Instructions:  Please stop herbs and any supplements now (includes vitamins and minerals). Please contact your surgeon and prescribing physician for pre-op instructions for any blood thinners. If you have inhalers/aerosol treatments at home, please use them the morning of your surgery and bring the inhalers with you to the hospital.    Please take the following medications the morning of your surgery with a sip of water:    Lisinopril    Surgery Instructions:  After midnight before surgery:  Do not eat or drink anything, including water, mints, gum, and hard candy. You may brush your teeth without swallowing. No smoking, chewing tobacco, or street drugs. Please shower or bathe before surgery. If you were given Surgical Scrub Chlorhexidine Gluconate Liquid (CHG), please shower the night before and the morning of your surgery following the detailed instructions you received during your pre-admission visit. Please do not wear any cologne, lotion, powder, deodorant, jewelry, piercings, perfume, makeup, nail polish, hair accessories, or hair spray on the day of surgery. Wear loose comfortable clothing. Leave your valuables at home. Bring a storage case for any glasses/contacts. An adult who is responsible for you MUST drive you home and should be with you for the first 24 hours after surgery. If having out-patient knee and foot surgeries, please arrange for planned crutches, walker, or wheelchair before arriving to the hospital.    The Day of Surgery:  Arrive at [facility] AT [facility] at the time directed by your surgeon and check in at the desk. If you have a living will or healthcare power of , please bring a copy. You will be taken to the pre-op holding area where you will be prepared for surgery.   A physical assessment will be performed by a nurse practitioner or house officer. Your IV will be started and you will meet your anesthesiologist.    When you go to surgery, your family will be directed to the surgical waiting room, where the doctor should speak with them after your surgery. After surgery, you will be taken to the recovery room then when you are awake and stable you will go to the short stay unit for preparation to be discharged. If you use a Bi-PAP or C-PAP machine, please bring it with you and leave it in the car in case it is needed in recovery room.

## 2023-03-02 NOTE — PRE-PROCEDURE INSTRUCTIONS
No answer, left message ? -YES                            Unable to leave message ? When were you told to arrive at hospital ?  -1000    Do you have a  ? Are you on any blood thinners ? If yes when did you stop taking ? Do you have your prep Rx filled and instruction ? Nothing to eat the day before , only clear liquids. Are you experiencing any covid symptoms ? Do you have any infections or rash we should be aware of ? Do you have the Hibiclens soap to use the night before and the morning of surgery ? Nothing to eat or drink after midnight, only a sip of water to take any medication instructed to take the night before. Wear comfortable clothing, leave any valuables at home, remove any jewelry and body piercing .

## 2023-03-03 ENCOUNTER — ANESTHESIA (OUTPATIENT)
Dept: OPERATING ROOM | Age: 33
End: 2023-03-03
Payer: COMMERCIAL

## 2023-03-03 ENCOUNTER — HOSPITAL ENCOUNTER (OUTPATIENT)
Age: 33
Setting detail: OUTPATIENT SURGERY
Discharge: HOME | End: 2023-03-03
Attending: STUDENT IN AN ORGANIZED HEALTH CARE EDUCATION/TRAINING PROGRAM | Admitting: STUDENT IN AN ORGANIZED HEALTH CARE EDUCATION/TRAINING PROGRAM
Payer: COMMERCIAL

## 2023-03-03 VITALS
HEART RATE: 84 BPM | TEMPERATURE: 97.2 F | OXYGEN SATURATION: 98 % | RESPIRATION RATE: 18 BRPM | SYSTOLIC BLOOD PRESSURE: 139 MMHG | DIASTOLIC BLOOD PRESSURE: 87 MMHG | WEIGHT: 275 LBS | HEIGHT: 64 IN | BODY MASS INDEX: 46.95 KG/M2

## 2023-03-03 DIAGNOSIS — K80.20 SYMPTOMATIC CHOLELITHIASIS: ICD-10-CM

## 2023-03-03 LAB
EKG ATRIAL RATE: 83 BPM
EKG P AXIS: 50 DEGREES
EKG P-R INTERVAL: 152 MS
EKG Q-T INTERVAL: 350 MS
EKG QRS DURATION: 80 MS
EKG QTC CALCULATION (BAZETT): 411 MS
EKG R AXIS: 53 DEGREES
EKG T AXIS: 44 DEGREES
EKG VENTRICULAR RATE: 83 BPM
HCG, PREGNANCY URINE (POC): NEGATIVE

## 2023-03-03 PROCEDURE — C1889 IMPLANT/INSERT DEVICE, NOC: HCPCS | Performed by: STUDENT IN AN ORGANIZED HEALTH CARE EDUCATION/TRAINING PROGRAM

## 2023-03-03 PROCEDURE — 2709999900 HC NON-CHARGEABLE SUPPLY: Performed by: STUDENT IN AN ORGANIZED HEALTH CARE EDUCATION/TRAINING PROGRAM

## 2023-03-03 PROCEDURE — S2900 ROBOTIC SURGICAL SYSTEM: HCPCS | Performed by: STUDENT IN AN ORGANIZED HEALTH CARE EDUCATION/TRAINING PROGRAM

## 2023-03-03 PROCEDURE — 7100000011 HC PHASE II RECOVERY - ADDTL 15 MIN: Performed by: STUDENT IN AN ORGANIZED HEALTH CARE EDUCATION/TRAINING PROGRAM

## 2023-03-03 PROCEDURE — 88304 TISSUE EXAM BY PATHOLOGIST: CPT

## 2023-03-03 PROCEDURE — 6360000002 HC RX W HCPCS: Performed by: NURSE ANESTHETIST, CERTIFIED REGISTERED

## 2023-03-03 PROCEDURE — 2580000003 HC RX 258: Performed by: ANESTHESIOLOGY

## 2023-03-03 PROCEDURE — 6360000002 HC RX W HCPCS: Performed by: ANESTHESIOLOGY

## 2023-03-03 PROCEDURE — 2500000003 HC RX 250 WO HCPCS: Performed by: STUDENT IN AN ORGANIZED HEALTH CARE EDUCATION/TRAINING PROGRAM

## 2023-03-03 PROCEDURE — 7100000030 HC ASPR PHASE II RECOVERY - FIRST 15 MIN: Performed by: STUDENT IN AN ORGANIZED HEALTH CARE EDUCATION/TRAINING PROGRAM

## 2023-03-03 PROCEDURE — 7100000010 HC PHASE II RECOVERY - FIRST 15 MIN: Performed by: STUDENT IN AN ORGANIZED HEALTH CARE EDUCATION/TRAINING PROGRAM

## 2023-03-03 PROCEDURE — 6360000002 HC RX W HCPCS: Performed by: STUDENT IN AN ORGANIZED HEALTH CARE EDUCATION/TRAINING PROGRAM

## 2023-03-03 PROCEDURE — 93010 ELECTROCARDIOGRAM REPORT: CPT | Performed by: INTERNAL MEDICINE

## 2023-03-03 PROCEDURE — 6370000000 HC RX 637 (ALT 250 FOR IP): Performed by: ANESTHESIOLOGY

## 2023-03-03 PROCEDURE — 7100000001 HC PACU RECOVERY - ADDTL 15 MIN: Performed by: STUDENT IN AN ORGANIZED HEALTH CARE EDUCATION/TRAINING PROGRAM

## 2023-03-03 PROCEDURE — 7100000031 HC ASPR PHASE II RECOVERY - ADDTL 15 MIN: Performed by: STUDENT IN AN ORGANIZED HEALTH CARE EDUCATION/TRAINING PROGRAM

## 2023-03-03 PROCEDURE — 2500000003 HC RX 250 WO HCPCS: Performed by: NURSE ANESTHETIST, CERTIFIED REGISTERED

## 2023-03-03 PROCEDURE — 2580000003 HC RX 258: Performed by: STUDENT IN AN ORGANIZED HEALTH CARE EDUCATION/TRAINING PROGRAM

## 2023-03-03 PROCEDURE — 3600000009 HC SURGERY ROBOT BASE: Performed by: STUDENT IN AN ORGANIZED HEALTH CARE EDUCATION/TRAINING PROGRAM

## 2023-03-03 PROCEDURE — 7100000000 HC PACU RECOVERY - FIRST 15 MIN: Performed by: STUDENT IN AN ORGANIZED HEALTH CARE EDUCATION/TRAINING PROGRAM

## 2023-03-03 PROCEDURE — 3600000019 HC SURGERY ROBOT ADDTL 15MIN: Performed by: STUDENT IN AN ORGANIZED HEALTH CARE EDUCATION/TRAINING PROGRAM

## 2023-03-03 PROCEDURE — 2500000003 HC RX 250 WO HCPCS: Performed by: ANESTHESIOLOGY

## 2023-03-03 PROCEDURE — 3700000001 HC ADD 15 MINUTES (ANESTHESIA): Performed by: STUDENT IN AN ORGANIZED HEALTH CARE EDUCATION/TRAINING PROGRAM

## 2023-03-03 PROCEDURE — 3700000000 HC ANESTHESIA ATTENDED CARE: Performed by: STUDENT IN AN ORGANIZED HEALTH CARE EDUCATION/TRAINING PROGRAM

## 2023-03-03 PROCEDURE — 81025 URINE PREGNANCY TEST: CPT

## 2023-03-03 PROCEDURE — 47562 LAPAROSCOPIC CHOLECYSTECTOMY: CPT | Performed by: STUDENT IN AN ORGANIZED HEALTH CARE EDUCATION/TRAINING PROGRAM

## 2023-03-03 PROCEDURE — A4216 STERILE WATER/SALINE, 10 ML: HCPCS | Performed by: ANESTHESIOLOGY

## 2023-03-03 DEVICE — CLIP INT L POLYMER LOK LIG HEM O LOK: Type: IMPLANTABLE DEVICE | Site: ABDOMEN | Status: FUNCTIONAL

## 2023-03-03 RX ORDER — ROCURONIUM BROMIDE 10 MG/ML
INJECTION, SOLUTION INTRAVENOUS PRN
Status: DISCONTINUED | OUTPATIENT
Start: 2023-03-03 | End: 2023-03-03 | Stop reason: SDUPTHER

## 2023-03-03 RX ORDER — ONDANSETRON 2 MG/ML
INJECTION INTRAMUSCULAR; INTRAVENOUS PRN
Status: DISCONTINUED | OUTPATIENT
Start: 2023-03-03 | End: 2023-03-03 | Stop reason: SDUPTHER

## 2023-03-03 RX ORDER — SODIUM CHLORIDE 0.9 % (FLUSH) 0.9 %
5-40 SYRINGE (ML) INJECTION EVERY 12 HOURS SCHEDULED
Status: DISCONTINUED | OUTPATIENT
Start: 2023-03-03 | End: 2023-03-03 | Stop reason: HOSPADM

## 2023-03-03 RX ORDER — INDOCYANINE GREEN AND WATER 25 MG
2.5 KIT INJECTION ONCE
Status: COMPLETED | OUTPATIENT
Start: 2023-03-03 | End: 2023-03-03

## 2023-03-03 RX ORDER — LIDOCAINE HYDROCHLORIDE AND EPINEPHRINE 10; 10 MG/ML; UG/ML
INJECTION, SOLUTION INFILTRATION; PERINEURAL PRN
Status: DISCONTINUED | OUTPATIENT
Start: 2023-03-03 | End: 2023-03-03 | Stop reason: ALTCHOICE

## 2023-03-03 RX ORDER — ONDANSETRON 4 MG/1
4 TABLET, FILM COATED ORAL 3 TIMES DAILY PRN
Qty: 15 TABLET | Refills: 0 | Status: SHIPPED | OUTPATIENT
Start: 2023-03-03

## 2023-03-03 RX ORDER — FENTANYL CITRATE 50 UG/ML
INJECTION, SOLUTION INTRAMUSCULAR; INTRAVENOUS PRN
Status: DISCONTINUED | OUTPATIENT
Start: 2023-03-03 | End: 2023-03-03 | Stop reason: SDUPTHER

## 2023-03-03 RX ORDER — SODIUM CHLORIDE 0.9 % (FLUSH) 0.9 %
5-40 SYRINGE (ML) INJECTION PRN
Status: DISCONTINUED | OUTPATIENT
Start: 2023-03-03 | End: 2023-03-03 | Stop reason: HOSPADM

## 2023-03-03 RX ORDER — DOCUSATE SODIUM 100 MG/1
100 CAPSULE, LIQUID FILLED ORAL 2 TIMES DAILY
Qty: 60 CAPSULE | Refills: 0 | Status: SHIPPED | OUTPATIENT
Start: 2023-03-03 | End: 2023-04-02

## 2023-03-03 RX ORDER — DEXAMETHASONE SODIUM PHOSPHATE 4 MG/ML
INJECTION, SOLUTION INTRA-ARTICULAR; INTRALESIONAL; INTRAMUSCULAR; INTRAVENOUS; SOFT TISSUE PRN
Status: DISCONTINUED | OUTPATIENT
Start: 2023-03-03 | End: 2023-03-03 | Stop reason: SDUPTHER

## 2023-03-03 RX ORDER — OXYCODONE HYDROCHLORIDE 5 MG/1
5 TABLET ORAL EVERY 6 HOURS PRN
Qty: 20 TABLET | Refills: 0 | Status: SHIPPED | OUTPATIENT
Start: 2023-03-03 | End: 2023-03-08

## 2023-03-03 RX ORDER — BUPIVACAINE HYDROCHLORIDE 2.5 MG/ML
INJECTION, SOLUTION EPIDURAL; INFILTRATION; INTRACAUDAL PRN
Status: DISCONTINUED | OUTPATIENT
Start: 2023-03-03 | End: 2023-03-03 | Stop reason: ALTCHOICE

## 2023-03-03 RX ORDER — MIDAZOLAM HYDROCHLORIDE 1 MG/ML
INJECTION INTRAMUSCULAR; INTRAVENOUS PRN
Status: DISCONTINUED | OUTPATIENT
Start: 2023-03-03 | End: 2023-03-03 | Stop reason: SDUPTHER

## 2023-03-03 RX ORDER — SODIUM CHLORIDE, SODIUM LACTATE, POTASSIUM CHLORIDE, CALCIUM CHLORIDE 600; 310; 30; 20 MG/100ML; MG/100ML; MG/100ML; MG/100ML
INJECTION, SOLUTION INTRAVENOUS CONTINUOUS
Status: DISCONTINUED | OUTPATIENT
Start: 2023-03-03 | End: 2023-03-03 | Stop reason: HOSPADM

## 2023-03-03 RX ORDER — LIDOCAINE HYDROCHLORIDE 10 MG/ML
INJECTION, SOLUTION EPIDURAL; INFILTRATION; INTRACAUDAL; PERINEURAL PRN
Status: DISCONTINUED | OUTPATIENT
Start: 2023-03-03 | End: 2023-03-03 | Stop reason: SDUPTHER

## 2023-03-03 RX ORDER — DIPHENHYDRAMINE HYDROCHLORIDE 50 MG/ML
12.5 INJECTION INTRAMUSCULAR; INTRAVENOUS
Status: DISCONTINUED | OUTPATIENT
Start: 2023-03-03 | End: 2023-03-03 | Stop reason: HOSPADM

## 2023-03-03 RX ORDER — ONDANSETRON 2 MG/ML
4 INJECTION INTRAMUSCULAR; INTRAVENOUS
Status: COMPLETED | OUTPATIENT
Start: 2023-03-03 | End: 2023-03-03

## 2023-03-03 RX ORDER — KETOROLAC TROMETHAMINE 30 MG/ML
INJECTION, SOLUTION INTRAMUSCULAR; INTRAVENOUS PRN
Status: DISCONTINUED | OUTPATIENT
Start: 2023-03-03 | End: 2023-03-03 | Stop reason: SDUPTHER

## 2023-03-03 RX ORDER — PROPOFOL 10 MG/ML
INJECTION, EMULSION INTRAVENOUS PRN
Status: DISCONTINUED | OUTPATIENT
Start: 2023-03-03 | End: 2023-03-03 | Stop reason: SDUPTHER

## 2023-03-03 RX ORDER — SODIUM CHLORIDE 9 MG/ML
INJECTION, SOLUTION INTRAVENOUS PRN
Status: DISCONTINUED | OUTPATIENT
Start: 2023-03-03 | End: 2023-03-03 | Stop reason: HOSPADM

## 2023-03-03 RX ORDER — SCOLOPAMINE TRANSDERMAL SYSTEM 1 MG/1
1 PATCH, EXTENDED RELEASE TRANSDERMAL ONCE
Status: DISCONTINUED | OUTPATIENT
Start: 2023-03-03 | End: 2023-03-03 | Stop reason: HOSPADM

## 2023-03-03 RX ADMIN — FENTANYL CITRATE 100 MCG: 50 INJECTION, SOLUTION INTRAMUSCULAR; INTRAVENOUS at 13:00

## 2023-03-03 RX ADMIN — INDOCYANINE GREEN AND WATER 2.5 MG: KIT at 11:41

## 2023-03-03 RX ADMIN — ONDANSETRON 4 MG: 2 INJECTION INTRAMUSCULAR; INTRAVENOUS at 15:07

## 2023-03-03 RX ADMIN — FAMOTIDINE 20 MG: 10 INJECTION, SOLUTION INTRAVENOUS at 11:23

## 2023-03-03 RX ADMIN — SODIUM CHLORIDE, POTASSIUM CHLORIDE, SODIUM LACTATE AND CALCIUM CHLORIDE: 600; 310; 30; 20 INJECTION, SOLUTION INTRAVENOUS at 10:41

## 2023-03-03 RX ADMIN — ROCURONIUM BROMIDE 50 MG: 10 INJECTION, SOLUTION INTRAVENOUS at 13:01

## 2023-03-03 RX ADMIN — HYDROMORPHONE HYDROCHLORIDE 0.5 MG: 0.5 INJECTION, SOLUTION INTRAMUSCULAR; INTRAVENOUS; SUBCUTANEOUS at 15:04

## 2023-03-03 RX ADMIN — PROPOFOL 200 MG: 10 INJECTION, EMULSION INTRAVENOUS at 13:01

## 2023-03-03 RX ADMIN — SUGAMMADEX 200 MG: 100 INJECTION, SOLUTION INTRAVENOUS at 14:23

## 2023-03-03 RX ADMIN — LIDOCAINE HYDROCHLORIDE 50 MG: 10 INJECTION, SOLUTION EPIDURAL; INFILTRATION; INTRACAUDAL; PERINEURAL at 13:00

## 2023-03-03 RX ADMIN — MIDAZOLAM 2 MG: 1 INJECTION INTRAMUSCULAR; INTRAVENOUS at 12:56

## 2023-03-03 RX ADMIN — ONDANSETRON 4 MG: 2 INJECTION, SOLUTION INTRAMUSCULAR; INTRAVENOUS at 13:09

## 2023-03-03 RX ADMIN — KETOROLAC TROMETHAMINE 30 MG: 30 INJECTION, SOLUTION INTRAMUSCULAR; INTRAVENOUS at 14:19

## 2023-03-03 RX ADMIN — Medication 3000 MG: at 13:02

## 2023-03-03 RX ADMIN — DEXAMETHASONE SODIUM PHOSPHATE 8 MG: 4 INJECTION, SOLUTION INTRAMUSCULAR; INTRAVENOUS at 13:09

## 2023-03-03 ASSESSMENT — PAIN DESCRIPTION - LOCATION
LOCATION: ABDOMEN

## 2023-03-03 ASSESSMENT — PAIN - FUNCTIONAL ASSESSMENT: PAIN_FUNCTIONAL_ASSESSMENT: NONE - DENIES PAIN

## 2023-03-03 ASSESSMENT — PAIN SCALES - GENERAL
PAINLEVEL_OUTOF10: 7
PAINLEVEL_OUTOF10: 6
PAINLEVEL_OUTOF10: 4

## 2023-03-03 ASSESSMENT — PAIN DESCRIPTION - DESCRIPTORS: DESCRIPTORS: ACHING

## 2023-03-03 ASSESSMENT — PAIN DESCRIPTION - PAIN TYPE
TYPE: SURGICAL PAIN

## 2023-03-03 ASSESSMENT — PAIN DESCRIPTION - FREQUENCY: FREQUENCY: CONTINUOUS

## 2023-03-03 NOTE — DISCHARGE INSTRUCTIONS
Post Operative Cholecystectomy Discharge Instructions    WOUND CARE:   You have skin glue and/or steri-strips overlying your incisions:  Allow the glue and steri-strips to fall off naturally. If they begin to peel, it is ok to trim the the steri-strips. If the glue and steri-strips remain intact at 2 weeks after your surgery, it is ok to remove at that time. Do not apply lotion or ointment over the incisions    BATHING:    Ok to shower 24 hrs after your operation. Wash incisions gently with soap and allow water to wash over the incisions. Do not submerge surgical incisions in water (baths, pools, hot tubs, lakes) for 2 weeks. DRIVING:   You may return to driving when: You are NOT taking narcotic pain medications AND  Your pain is minimal enough where you can safely slam on the breaks or turn suddenly if you needed to. For most patients take 10-14 days before they feel comfortable enough they could move quickly if needed. ACTIVITY:  No pushing, pulling, or lifting more than 10 lbs for 2 weeks    If a work note is needed, please call the surgery office or discuss at your first post operative visit  Exercise:  Listen to your body -- if you are experiencing pain - refrain from such activity, except for walking. You should be walking daily to prevent blood clots in your veins. You may return to all activities without restrictions:    2 weeks after surgery AND  When you are not experiencing pain at your incisions with movement    LIFTING:   No lifting greater than 10 lbs for 2 weeks    DIET:   No dietary restrictions  The first 24 hours after surgery, you may experience nausea from the anesthesia. Start slow with bland food prior to trying greasy or fatty foods. Anything you can tolerate or sounds good, you may eat. No type of food is \"off limits\" or harmful after your gallbladder is removed.   Without the gallbadder, some patients may experience loose stool/diarrhea when attempting high fatty foods or greasy foods for the first 6-8 weeks post operatively. If this happens, refrain from the foods for a couple weeks while your liver begins to produce and increase in bile to overcome not having the gallbadder. MEDICATIONS:     Take medications as prescribed. Constipation Medications  It is important for you to take stool softeners (Doculax, Senna, Senakot, etc) when you are taking narcotic pain medications. Once you are not taking narcotics, you can discontinue stool softener medications  If you do not have a bowel movement withint 2-3 days of surgery, begin taking 17 g of Miralax (over the counter) daily until you achieve a bowel movement. Narcotic medications cause constipation. If you become constipated, it may be painful at your incision sites to have bowel movements. Pain Medications:  Schedule Tylenol 1,000 mg every 8 hours (3 times daily)  Do not take this medication if you have liver problems  Schedule Motrin 400-600 mg every 6 hours (4 times daily)  Do not take this medication if you have kidney problems  Narcotics will be prescribed for you for 5-7 days post operatively - unless discussed pre-operatively. Take the narcotic medications as prescribed and wean as quickly as possible. Most patients require less than prescribed  You underwent surgery and will experience pain at the incision sites. This is normal. The pain will improve over the first 7-10 days with the expectation it may take 4-6 weeks before feeling back to your normal self. Work to wean of narcotic medications as quickly as able. These medications have the risk of addiction  Anticoagulation (blood thinners)  Ok to continue or restart Aspirin   Restart other anticoagulation -- (Coumadin, Eliquis, Plavix, etc) in 48 hours after surgery. If you have questions on what medications are ok to take -- ask your doctor.     FOLLOW UP:   Call and schedule post operative follow up with Dr. Norman Johnson in 2 weeks - call sooner if there are questions or concerns.     WHEN TO CALL:  If you experience a fever of 101 or greater  If you notice redness around your incision which is increasing in size  If you notice bloody, green, or brown drainage from your incision  If you experience difficulty urinating  If your abdominal pain is worsening or to a point where you are unable to walk and do mild daily activities  If you are having episodes of vomiting and unable to stay hydrated  If you notice your skin and/or eyes have a yellow color to them

## 2023-03-03 NOTE — ANESTHESIA POSTPROCEDURE EVALUATION
POST- ANESTHESIA EVALUATION       Pt Name: Lavon Rose  MRN: [id number]  YOB: 1990  Date of evaluation: 3/3/2023  Time:  6:46 PM      /87   Pulse 84   Temp 97.2 °F (36.2 °C) (Temporal)   Resp 18   Ht 5' 4\" (1.626 m)   Wt 275 lb (124.7 kg)   LMP 02/14/2023 (Approximate) Comment: urine pregnancy test negative 3/3/23  SpO2 98%   BMI 47.20 kg/m²      Consciousness Level  Awake  Cardiopulmonary Status  Stable  Pain Adequately Treated YES  Nausea / Vomiting  NO  Adequate Hydration  YES  Anesthesia Related Complications NONE      Electronically signed by Blank Thayer MD on 3/3/2023 at 6:46 PM       Department of Anesthesiology  Postprocedure Note    Patient: Lavon Rose  MRN: [id number]  YOB: 1990  Date of evaluation: 3/3/2023      Procedure Summary     Date: 03/03/23 Room / Location: [address] [place] [address]: [facility]    Anesthesia Start: 1416 Anesthesia Stop: 6081    Procedure: CHOLECYSTECTOMY LAPAROSCOPIC ROBOTIC XI (Abdomen) Diagnosis:       Symptomatic cholelithiasis      (Symptomatic cholelithiasis [K80.20])    Surgeons: Jignesh Jenkins DO Responsible Provider: Jarod Gallegos MD    Anesthesia Type: General ASA Status: 3          Anesthesia Type: General    Sudha Phase I: Sudha Score: 10    Sudha Phase II: Sudha Score: 10      Anesthesia Post Evaluation

## 2023-03-03 NOTE — ANESTHESIA PRE PROCEDURE
Department of Anesthesiology  Preprocedure Note       Name:  Amando Ritchie   Age:  34 y.o.  :  1990                                          MRN:  [id number]         Date:  3/3/2023      Surgeon: Cecilia Lara):  Orestes Christensen DO    Procedure: Procedure(s):  CHOLECYSTECTOMY LAPAROSCOPIC ROBOTIC XI    Medications prior to admission:   Prior to Admission medications    Medication Sig Start Date End Date Taking? Authorizing Provider   lisinopril (PRINIVIL;ZESTRIL) 10 MG tablet Take 20 mg by mouth daily    Historical Provider, MD   ondansetron (ZOFRAN-ODT) 4 MG disintegrating tablet Take 1 tablet by mouth every 8 hours as needed for Nausea or Vomiting 23   Nikhil Roper DO       Current medications:    No current facility-administered medications for this encounter. Allergies:  No Known Allergies    Problem List:    Patient Active Problem List   Diagnosis Code    Allergic rhinitis J30.9    Family history of diabetes mellitus Z83.3    Iron deficiency anemia D50.9    HTN (hypertension) I10    Hyperlipemia E78.5    Axillary pain M79.629    Obesity E66.9    Symptomatic cholelithiasis K80.20       Past Medical History:        Diagnosis Date    Abdominal pain 2023    Axillary pain     Bilat    Cholelithiasis     Hypertension     Iron deficiency anemia     Left wrist fracture 2002    Nausea        Past Surgical History:        Procedure Laterality Date    BREAST SURGERY Right 2014    needle aspiration    KNEE ARTHROSCOPY Left //    No meniscus left    LIPOMA RESECTION Right 2013    Axillary       Social History:    Social History     Tobacco Use    Smoking status: Never    Smokeless tobacco: Never   Substance Use Topics    Alcohol use: Yes     Comment: very rare                                Counseling given: Not Answered      Vital Signs (Current): There were no vitals filed for this visit.                                            BP Readings from Last 3 Encounters:   03/02/23 (!) 146/102   03/01/23 (!) 162/95   02/16/23 (!) 185/119       NPO Status:                                                                                 BMI:   Wt Readings from Last 3 Encounters:   03/02/23 275 lb (124.7 kg)   03/01/23 279 lb 14.4 oz (127 kg)   02/16/23 280 lb (127 kg)     There is no height or weight on file to calculate BMI.    CBC:   Lab Results   Component Value Date/Time    WBC 11.6 02/16/2023 08:56 PM    RBC 4.74 02/16/2023 08:56 PM    HGB 14.4 02/16/2023 08:56 PM    HCT 43.2 02/16/2023 08:56 PM    MCV 91.3 02/16/2023 08:56 PM    RDW 13.0 02/16/2023 08:56 PM     02/16/2023 08:56 PM       CMP:   Lab Results   Component Value Date/Time     02/16/2023 08:56 PM    K 4.1 02/16/2023 08:56 PM     02/16/2023 08:56 PM    CO2 26 02/16/2023 08:56 PM    BUN 14 02/16/2023 08:56 PM    CREATININE 0.63 02/16/2023 08:56 PM    GFRAA >60 04/14/2014 09:17 AM    LABGLOM >60 02/16/2023 08:56 PM    GLUCOSE 121 02/16/2023 08:56 PM    PROT 7.8 02/16/2023 08:56 PM    CALCIUM 9.0 02/16/2023 08:56 PM    BILITOT 0.5 02/16/2023 08:56 PM    ALKPHOS 145 02/16/2023 08:56 PM     02/16/2023 08:56 PM     02/16/2023 08:56 PM       POC Tests: No results for input(s): POCGLU, POCNA, POCK, POCCL, POCBUN, POCHEMO, POCHCT in the last 72 hours. Coags: No results found for: PROTIME, INR, APTT    HCG (If Applicable):   Lab Results   Component Value Date    PREGTESTUR NEGATIVE 12/16/2013        ABGs: No results found for: PHART, PO2ART, SQR2SGI, NDL5AIL, BEART, R5KERLNH     Type & Screen (If Applicable):  No results found for: LABABO, LABRH    Drug/Infectious Status (If Applicable):  No results found for: HIV, HEPCAB    COVID-19 Screening (If Applicable): No results found for: COVID19        Anesthesia Evaluation  Patient summary reviewed and Nursing notes reviewed   history of anesthetic complications: PONV.   Airway: Mallampati: II  TM distance: >3 FB   Neck ROM: full  Mouth opening: > = 3 FB   Dental: normal exam         Pulmonary:Negative Pulmonary ROS and normal exam  breath sounds clear to auscultation                             Cardiovascular:    (+) hypertension:,       ECG reviewed  Rhythm: regular  Rate: normal           Beta Blocker:  Not on Beta Blocker         Neuro/Psych:   Negative Neuro/Psych ROS              GI/Hepatic/Renal:   (+) morbid obesity         ROS comment: Symptomatic cholelithiasis . Endo/Other:                     Abdominal:             Vascular: negative vascular ROS. Other Findings:           Anesthesia Plan      general     ASA 3     (GETA  Scopolamine patch applied as well as Pepcid IVP )  Induction: intravenous. MIPS: Postoperative opioids intended and Prophylactic antiemetics administered. Anesthetic plan and risks discussed with patient. Plan discussed with CRNA.             Scopolamine patch and Pepcid IVP ordered in pre op        Brittney Barnett MD   3/3/2023

## 2023-03-03 NOTE — OP NOTE
Operative Note      Patient: Kirsty Gramajo  YOB: 1990  MRN: [id number]    Date of Procedure: 3/3/2023    Pre-Op Diagnosis: Symptomatic cholelithiasis [K80.20]    Post-Op Diagnosis: Same       Procedure(s):  Robotic assisted laparoscopic cholecystectomy   Bilateral transversalis abdominus plane block    Surgeon(s):  Ramya Christensen DO    Assistant: None    Anesthesia: General    Estimated Blood Loss (mL): 5    Complications: None    Specimens:   ID Type Source Tests Collected by Time Destination   A : GALLBLADDER Tissue Gallbladder SURGICAL PATHOLOGY Quita Christensen DO 3/3/2023 1414        Implants:  Implant Name Type Inv. Item Serial No.  Lot No. LRB No. Used Action   CLIP INT L POLYMER CHUY LIG HEM O CHUY - [id number]  CLIP INT L POLYMER CHUY LIG HEM O CHUY  [employer] [id number] N/A 1 Implanted         Drains:   NG/OG/NJ/NE Tube Orogastric 18 fr (Active)       Findings: Intra-hepatic gallbladder, 1 cm stone at neck of the gallbladder, cystic artery running along backside of the gallbladder. Indications: The patient is a 59-year-old female who presented as an outpatient consult this week for complaints of 2 weeks of abdominal pain, nausea, vomiting. Patient was seen in the emergency department on 2/16/2023 with a CT scan without abnormalities. She had mildly elevated AST and ALT with a normal total bilirubin. She continued to experience ongoing abdominal pain, nausea and vomiting when she was attempting to eat, and she underwent an ultrasound of her right upper quadrant confirming gallstone in the neck of the gallbladder. After the discussion with the patient at bedside about her symptoms, the decision was made to offer a robotic assisted laparoscopic cholecystectomy. The procedure was explained along with risk versus benefits and alternatives. Detailed Description of Procedure: The patient was brought to the operating room placed on the OR table in supine position.   General anesthesia was induced by the anesthesia team.  A formal timeout identifying the patient, procedure, allergies, and antibiotics was performed. The patient was given 3 g Ancef prior to incision. The abdomen was prepped with ChloraPrep and draped in the usual sterile fashion. A 12 mm incision was made in the left upper quadrant at Stone's point. A Veress needle was inserted into the abdomen and confirmed by an aspiration and saline drop test.  The abdomen was insufflated to 15 mmHg of CO2 without difficulty. The Veress needle was removed, and a 12 mm port was placed via Optiview fashion. There was no bowel or mesenteric injury deep to the port placement. 3 additional 8 mm ports were placed across the abdomen heading to the right anterior axillary line, all 8 cm apart. The patient was placed in a reverse Trendelenburg position. The robot was docked without difficulty. The fundus of the gallbladder was visualized and grasped. The fundus was nondistended and carried up over the liver towards the right shoulder. There were no adhesions, but there was a dense area of peritoneum and fatty tissue overlying the infundibulum. The body of the gallbladder was grasped and the peritoneum was scored with electrocautery. The peritoneum was dissected off the gallbladder heading lateral towards the cystic plate. The remainder of the fatty tissue on the infundibulum was carefully swept inferiorly. The peritoneum was taken off medially towards the cystic artery which was not in the normal region of the Calot's triangle. Careful dissection was carried along the infundibulum and down towards the transition of the cystic duct, working medially and laterally utilizing firefly for guidance. At 1 point when the infundibulum was carried medially there was a cystic structure identified on the backside of the infundibulum and gallbladder body.   Circumferential dissection was performed, and this was a cystic structure going straight into the gallbladder and was confirmed to be the cystic artery. 2 hemoclips were placed across the vessel and transected clearing off for further visualization of the cystic duct, which appeared to be posterior and adhesed to the liver bed. The gallbladder appeared to be twisted upon itself hiding the cystic duct posteriorly, and tedious dissection was performed using blunt and sharp dissection, along with the guidance of firefly to preserve the critical structures until the cystic duct was completely circumferentially dissected safely. The inferior portion of the gallbladder body was dissected off of the cystic plate. Hemoclips were placed across the cystic duct, 2 proximal and 1 distal.  The cystic duct was transected. The gallbladder was carefully dissected off of the cystic plate using electrocautery maintaining hemostasis along the way. Once the gallbladder was dissected off of the liver, it was placed in Endo Catch bag through the left upper quadrant port. The liver bed was inspected and all hemostasis was obtained. The robot was undocked. The left upper quadrant port and Endo Catch bag removed. There was a nearly 1 cm stone wedged within the neck of the gallbladder on evaluation. The gallbladder and contents were sent to pathology to be examined. The fascia of the left upper quadrant incision was approximated with a simple interrupted 0 Vicryl suture passed on a Keen Guides needle. A bilateral tap block was performed with an 18-gauge spinal needle and 0.25% Marcaine plain 30 mL was injected on either side of the abdomen in line with the umbilicus under direct visualization. Insufflation was evacuated. The remainder ports were removed. The fascial suture in the left upper quadrant was tied down without any signs of abdominal muscle opening. Hemostasis was obtained and confirmed in all 4 ports. The skin was closed with simple interrupted deep dermal fashion.   Dermabond was placed across the skin incisions for surgical dressing. This concluded the procedure. The patient tolerated well without immediate complications. All sponge, needles, instrument counts were correct at the end the case. The patient was awoken in the operating room, extubated, transferred to PACU in stable condition.     Electronically signed by Jignesh Jenkins DO on 3/3/2023 at 3:21 PM

## 2023-03-03 NOTE — INTERVAL H&P NOTE
Update History & Physical    The patient's History and Physical of March 2, 2023 was reviewed with the patient and I examined the patient. There was no change. Here today for CHOLECYSTECTOMY LAPAROSCOPIC ROBOTIC XI per Dr. Aleisha Hirsch. Pt AAO x 3 in NAD. HRRR. No adventitious lung sounds. No respiratory distress. NPO p MN. Took lisinopril this am with sip of water. Denies taking anticoagulants or blood thinning medications. Denies current chest pain/pressure, palpitations, SOB, recent URI, fever or chills. Review vitals per RN flowsheet.          Electronically signed by NICK Rodriguez CNP on 3/3/2023 at 10:05 AM

## 2023-03-07 LAB — SURGICAL PATHOLOGY REPORT: NORMAL

## 2023-03-20 ENCOUNTER — OFFICE VISIT (OUTPATIENT)
Dept: SURGERY | Age: 33
End: 2023-03-20

## 2023-03-20 VITALS
WEIGHT: 279 LBS | SYSTOLIC BLOOD PRESSURE: 163 MMHG | DIASTOLIC BLOOD PRESSURE: 113 MMHG | HEART RATE: 90 BPM | TEMPERATURE: 97.9 F | BODY MASS INDEX: 47.89 KG/M2

## 2023-03-20 DIAGNOSIS — K81.1 CHRONIC CHOLECYSTITIS: Primary | ICD-10-CM

## 2023-03-20 DIAGNOSIS — K80.20 SYMPTOMATIC CHOLELITHIASIS: ICD-10-CM

## 2023-03-20 PROCEDURE — 99024 POSTOP FOLLOW-UP VISIT: CPT | Performed by: STUDENT IN AN ORGANIZED HEALTH CARE EDUCATION/TRAINING PROGRAM

## 2023-03-20 NOTE — PROGRESS NOTES
gabapentin prescription.  Patient to call if she needs this  Follow up as needed      Flash Gerber, DO  3/20/2023

## 2023-03-20 NOTE — LETTER
March 20, 2023       Amara Eaton YOB: 1990   Junior Cuellar Date of Visit:  3/20/2023       To Whom It May Concern: It is my medical opinion that Asberry Aschoff may return to work on 3/20/2023 without strict restrictions. If you have any questions or concerns, please don't hesitate to call.     Sincerely,        Ramya Christensen, DO

## (undated) DEVICE — MARKER,SKIN,WI/RULER AND LABELS: Brand: MEDLINE

## (undated) DEVICE — 3M™ IOBAN™ 2 ANTIMICROBIAL INCISE DRAPE 6650EZ: Brand: IOBAN™ 2

## (undated) DEVICE — CANNULA SEAL

## (undated) DEVICE — MERCY HEALTH ST CHARLES: Brand: MEDLINE INDUSTRIES, INC.

## (undated) DEVICE — SUTURE MCRYL + SZ 4-0 L27IN ABSRB UD L19MM PS-2 3/8 CIR MCP426H

## (undated) DEVICE — TROCAR: Brand: KII FIOS FIRST ENTRY

## (undated) DEVICE — GLOVE SURG SZ 7 CRM LTX FREE POLYISOPRENE POLYMER BEAD ANTI

## (undated) DEVICE — BLADELESS OBTURATOR: Brand: WECK VISTA

## (undated) DEVICE — Device

## (undated) DEVICE — SYRINGE MED 10ML LUERLOCK TIP W/O SFTY DISP

## (undated) DEVICE — NEEDLE SPNL 18GA L3.5IN W/ QNCKE SHARPER BVL DURA CLICK

## (undated) DEVICE — INSUFFLATION NEEDLE TO ESTABLISH PNEUMOPERITONEUM.: Brand: INSUFFLATION NEEDLE

## (undated) DEVICE — TISSUE RETRIEVAL SYSTEM: Brand: INZII RETRIEVAL SYSTEM

## (undated) DEVICE — BLANKET WRM W29.9XL79.1IN UP BODY FORC AIR MISTRAL-AIR

## (undated) DEVICE — ADHESIVE SKIN CLOSURE TOP 36 CC HI VISC DERMBND MINI

## (undated) DEVICE — SOLUTION ANTIFOG VIS SYS CLEARIFY LAPSCP

## (undated) DEVICE — COVER,TABLE,60X90,STERILE: Brand: MEDLINE

## (undated) DEVICE — SUTURE VCRL + SZ 0 L27IN ABSRB WHT CT-2 1/2 CIR TAPERPOINT VCP270H

## (undated) DEVICE — INTENDED FOR TISSUE SEPARATION, AND OTHER PROCEDURES THAT REQUIRE A SHARP SURGICAL BLADE TO PUNCTURE OR CUT.: Brand: BARD-PARKER ® CARBON RIB-BACK BLADES

## (undated) DEVICE — SYRINGE MED 30ML STD CLR PLAS LUERLOCK TIP N CTRL DISP

## (undated) DEVICE — STRIP,CLOSURE,WOUND,MEDI-STRIP,1/2X4: Brand: MEDLINE

## (undated) DEVICE — ARM DRAPE

## (undated) DEVICE — ST CHARLES GEN LAPAROSCOPY PK: Brand: MEDLINE INDUSTRIES, INC.

## (undated) DEVICE — GOWN,SIRUS,NONRNF,SETINSLV,XL,20/CS: Brand: MEDLINE